# Patient Record
Sex: MALE | Race: WHITE | Employment: FULL TIME | URBAN - METROPOLITAN AREA
[De-identification: names, ages, dates, MRNs, and addresses within clinical notes are randomized per-mention and may not be internally consistent; named-entity substitution may affect disease eponyms.]

---

## 2017-01-23 RX ORDER — BUSPIRONE HYDROCHLORIDE 15 MG/1
TABLET ORAL
Qty: 360 TAB | Refills: 1 | Status: SHIPPED | OUTPATIENT
Start: 2017-01-23 | End: 2017-07-20 | Stop reason: SDUPTHER

## 2017-02-15 RX ORDER — DICLOFENAC SODIUM 75 MG/1
TABLET, DELAYED RELEASE ORAL
Qty: 60 TAB | Refills: 0 | Status: SHIPPED | OUTPATIENT
Start: 2017-02-15

## 2017-02-20 RX ORDER — PAROXETINE HYDROCHLORIDE 40 MG/1
TABLET, FILM COATED ORAL
Qty: 90 TAB | Refills: 1 | Status: SHIPPED | OUTPATIENT
Start: 2017-02-20 | End: 2017-06-26 | Stop reason: SDUPTHER

## 2017-03-14 RX ORDER — DICLOFENAC SODIUM 75 MG/1
TABLET, DELAYED RELEASE ORAL
Qty: 60 TAB | Refills: 0 | OUTPATIENT
Start: 2017-03-14

## 2017-03-14 NOTE — TELEPHONE ENCOUNTER
Received faxed refill request for this medication from the pharmacy that is on file.     diclofenac EC (VOLTAREN) 75 mg EC tablet  Normal   Due office visit   Disp-60 Tab, R-0

## 2017-06-26 RX ORDER — PAROXETINE HYDROCHLORIDE 40 MG/1
TABLET, FILM COATED ORAL
Qty: 90 TAB | Refills: 0 | Status: SHIPPED | OUTPATIENT
Start: 2017-06-26 | End: 2017-12-17 | Stop reason: SDUPTHER

## 2017-07-20 RX ORDER — BUSPIRONE HYDROCHLORIDE 15 MG/1
TABLET ORAL
Qty: 360 TAB | Refills: 0 | Status: SHIPPED | OUTPATIENT
Start: 2017-07-20 | End: 2017-10-16 | Stop reason: SDUPTHER

## 2017-10-09 ENCOUNTER — TELEPHONE (OUTPATIENT)
Dept: FAMILY MEDICINE CLINIC | Age: 52
End: 2017-10-09

## 2017-10-09 NOTE — TELEPHONE ENCOUNTER
Patient is calling in regards to a missed call from [place], tried contacting nurse, no success.     Best call back # for patient:[phone]

## 2017-10-16 RX ORDER — BUSPIRONE HYDROCHLORIDE 15 MG/1
TABLET ORAL
Qty: 360 TAB | Refills: 0 | Status: SHIPPED | OUTPATIENT
Start: 2017-10-16 | End: 2018-01-13 | Stop reason: SDUPTHER

## 2017-11-22 ENCOUNTER — OFFICE VISIT (OUTPATIENT)
Dept: FAMILY MEDICINE CLINIC | Age: 52
End: 2017-11-22

## 2017-11-22 VITALS
OXYGEN SATURATION: 97 % | HEIGHT: 66 IN | WEIGHT: 204.8 LBS | HEART RATE: 60 BPM | DIASTOLIC BLOOD PRESSURE: 74 MMHG | SYSTOLIC BLOOD PRESSURE: 133 MMHG | RESPIRATION RATE: 18 BRPM | TEMPERATURE: 97.6 F | BODY MASS INDEX: 32.92 KG/M2

## 2017-11-22 DIAGNOSIS — R56.9 CONVULSIONS, UNSPECIFIED CONVULSION TYPE (HCC): ICD-10-CM

## 2017-11-22 DIAGNOSIS — Q28.2 AVM (ARTERIOVENOUS MALFORMATION) BRAIN: Chronic | ICD-10-CM

## 2017-11-22 DIAGNOSIS — F43.23 ADJUSTMENT DISORDER WITH MIXED ANXIETY AND DEPRESSED MOOD: ICD-10-CM

## 2017-11-22 DIAGNOSIS — Z85.828 HISTORY OF SKIN CANCER: ICD-10-CM

## 2017-11-22 DIAGNOSIS — Z00.00 ROUTINE PHYSICAL EXAMINATION: Primary | ICD-10-CM

## 2017-11-22 DIAGNOSIS — L70.0 ACNE VULGARIS: ICD-10-CM

## 2017-11-22 DIAGNOSIS — Z11.59 NEED FOR HEPATITIS C SCREENING TEST: ICD-10-CM

## 2017-11-22 DIAGNOSIS — L03.90 CELLULITIS, UNSPECIFIED CELLULITIS SITE: ICD-10-CM

## 2017-11-22 DIAGNOSIS — R56.9 SEIZURES (HCC): ICD-10-CM

## 2017-11-22 DIAGNOSIS — M19.011 PRIMARY OSTEOARTHRITIS OF RIGHT SHOULDER: Chronic | ICD-10-CM

## 2017-11-22 RX ORDER — AMOXICILLIN 500 MG/1
CAPSULE ORAL
COMMUNITY
Start: 2017-10-25 | End: 2017-11-22 | Stop reason: ALTCHOICE

## 2017-11-22 RX ORDER — CEPHALEXIN 500 MG/1
500 CAPSULE ORAL 4 TIMES DAILY
Qty: 28 CAP | Refills: 0 | Status: SHIPPED | OUTPATIENT
Start: 2017-11-22 | End: 2017-11-29

## 2017-11-22 NOTE — PATIENT INSTRUCTIONS

## 2017-11-22 NOTE — MR AVS SNAPSHOT
Visit Information Date & Time Provider Department Dept. Phone Encounter #  
 11/22/2017  8:15 AM Ursula Alarcon  [facility] [phone] [id number] Upcoming Health Maintenance Date Due COLONOSCOPY 4/15/2017 DTaP/Tdap/Td series (2 - Td) 8/2/2021 Allergies as of 11/22/2017  Review Complete On: 11/22/2017 By: Arun Mcnair LPN No Known Allergies Current Immunizations  Never Reviewed Name Date Influenza Vaccine 11/7/2015 Influenza Vaccine Split 11/1/2012 TDAP Vaccine 8/2/2011 Not reviewed this visit You Were Diagnosed With   
  
 Codes Comments Routine physical examination    -  Primary ICD-10-CM: Z00.00 ICD-9-CM: V70.0 Seizures ([facility]     ICD-10-CM: R56.9 ICD-9-CM: 780.39 Convulsions, unspecified convulsion type ([facility]     ICD-10-CM: R56.9 ICD-9-CM: 780.39   
 AVM (arteriovenous malformation) brain     ICD-10-CM: Q28.2 ICD-9-CM: 80.80 Primary osteoarthritis of right shoulder     ICD-10-CM: M19.011 
ICD-9-CM: 715.11 Adjustment disorder with mixed anxiety and depressed mood     ICD-10-CM: F43.23 
ICD-9-CM: 309.28 History of skin cancer     ICD-10-CM: G47.162 ICD-9-CM: V10.83 Acne vulgaris     ICD-10-CM: L70.0 ICD-9-CM: 706.1 Need for hepatitis C screening test     ICD-10-CM: Z11.59 
ICD-9-CM: V73.89 Vitals BP Pulse Temp Resp Height(growth percentile) Weight(growth percentile) 133/74 (BP 1 Location: Left arm, BP Patient Position: Sitting) 60 97.6 °F (36.4 °C) (Oral) 18 5' 6\" (1.676 m) 204 lb 12.8 oz (92.9 kg) SpO2 BMI Smoking Status 97% 33.06 kg/m2 Former Smoker Vitals History BMI and BSA Data Body Mass Index Body Surface Area 33.06 kg/m 2 2.08 m 2 Preferred Pharmacy Pharmacy Name Phone [address] [facility], [address] [facility], [address] [phone] Your Updated Medication List  
  
   
 This list is accurate as of: 11/22/17  9:24 AM.  Always use your most recent med list.  
  
  
  
  
 AVAR-E LS 10-2 % Crea Generic drug:  Sulfacetamide Sodium-Sulfur  
  
 busPIRone 15 mg tablet Commonly known as:  BUSPAR  
TAKE TWO TABLETS BY MOUTH TWICE A DAY **GENERIC FOR: BUSPAR  
  
 carBAMazepine 200 mg tablet Commonly known as:  TEGretol Take 3 Tabs by mouth two (2) times a day. MON,WED and FRI- 600mg bid Other days, 800mg in am and 600mg in pm  Indications: PREVENT SEIZURES AFTER HEAD TRAUMA OR SURGERY  
  
 diclofenac EC 75 mg EC tablet Commonly known as:  VOLTAREN  
TAKE ONE TABLET BY MOUTH TWICE A DAY  WITH FOOD AS NEEDED FOR SHOULDER PAIN. **GENERIC FOR: VOLTAREN  
  
 doxycycline 100 mg capsule Commonly known as:  VIBRAMYCIN  
  
 ONE-A-DAY MEN'S 50 PLUS 400- mcg Tab Generic drug:  multivit-min-folic-vit K-lycop Take  by mouth. PARoxetine 40 mg tablet Commonly known as:  PAXIL TAKE ONE TABLET BY MOUTH DAILY  
  
 TYLENOL EXTRA STRENGTH 500 mg tablet Generic drug:  acetaminophen Take 2 Tabs by mouth every six (6) hours as needed for Pain. We Performed the Following AMB POC EKG ROUTINE W/ 12 LEADS, INTER & REP [47479 CPT(R)] CARBAMAZEPINE [id number] CPT(R)] CBC W/O DIFF [69399 CPT(R)] HEPATITIS C AB [14727 CPT(R)] LIPID PANEL [55281 CPT(R)] METABOLIC PANEL, COMPREHENSIVE [73027 CPT(R)] PSA W/ REFLX FREE PSA [55587 CPT(R)] URINALYSIS W/MICROSCOPIC [08938 CPT(R)] Patient Instructions Well Visit, Men 48 to 72: Care Instructions Your Care Instructions Physical exams can help you stay healthy. Your doctor has checked your overall health and may have suggested ways to take good care of yourself. He or she also may have recommended tests. At home, you can help prevent illness with healthy eating, regular exercise, and other steps. Follow-up care is a key part of your treatment and safety.  Be sure to make and go to all appointments, and call your doctor if you are having problems. It's also a good idea to know your test results and keep a list of the medicines you take. How can you care for yourself at home? · Reach and stay at a healthy weight. This will lower your risk for many problems, such as obesity, diabetes, heart disease, and high blood pressure. · Get at least 30 minutes of exercise on most days of the week. Walking is a good choice. You also may want to do other activities, such as running, swimming, cycling, or playing tennis or team sports. · Do not smoke. Smoking can make health problems worse. If you need help quitting, talk to your doctor about stop-smoking programs and medicines. These can increase your chances of quitting for good. · Protect your skin from too much sun. When you're outdoors from 10 a.m. to 4 p.m., stay in the shade or cover up with clothing and a hat with a wide brim. Wear sunglasses that block UV rays. Even when it's cloudy, put broad-spectrum sunscreen (SPF 30 or higher) on any exposed skin. · See a dentist one or two times a year for checkups and to have your teeth cleaned. · Wear a seat belt in the car. · Limit alcohol to 2 drinks a day. Too much alcohol can cause health problems. Follow your doctor's advice about when to have certain tests. These tests can spot problems early. · Cholesterol. Your doctor will tell you how often to have this done based on your overall health and other things that can increase your risk for heart attack and stroke. · Blood pressure. Have your blood pressure checked during a routine doctor visit. Your doctor will tell you how often to check your blood pressure based on your age, your blood pressure results, and other factors. · Prostate exam. Talk to your doctor about whether you should have a blood test (called a PSA test) for prostate cancer.  Experts disagree on whether men should have this test. Some experts recommend that you discuss the benefits and risks of the test with your doctor. · Diabetes. Ask your doctor whether you should have tests for diabetes. · Vision. Some experts recommend that you have yearly exams for glaucoma and other age-related eye problems starting at age 48. · Hearing. Tell your doctor if you notice any change in your hearing. You can have tests to find out how well you hear. · Colon cancer. You should begin tests for colon cancer at age 48. You may have one of several tests. Your doctor will tell you how often to have tests based on your age and risk. Risks include whether you already had a precancerous polyp removed from your colon or whether your parent, brother, sister, or child has had colon cancer. · Heart attack and stroke risk. At least every 4 to 6 years, you should have your risk for heart attack and stroke assessed. Your doctor uses factors such as your age, blood pressure, cholesterol, and whether you smoke or have diabetes to show what your risk for a heart attack or stroke is over the next 10 years. · Abdominal aortic aneurysm. Ask your doctor whether you should have a test to check for an aneurysm. You may need a test if you ever smoked or if your parent, brother, sister, or child has had an aneurysm. When should you call for help? Watch closely for changes in your health, and be sure to contact your doctor if you have any problems or symptoms that concern you. Where can you learn more? Go to http://chris-leland.info/. Enter W200 in the search box to learn more about \"Well Visit, Men 48 to 72: Care Instructions. \" Current as of: May 12, 2017 Content Version: 11.4 © 6534-5258 [employer]wise[employer]orporated. Care instructions adapted under license by [employer] (which disclaims liability or warranty for this information).  If you have questions about a medical condition or this instruction, always ask your healthcare professional. [address] any warranty or liability for your use of this information. Introducing [facility] [place] [facility]! Dear Cecilia Bearden: Thank you for requesting a [employer] account. Our records indicate that you already have an active [employer] account. You can access your account anytime at [email or web address] Did you know that you can access your hospital and ER discharge instructions at any time in [employer]? You can also review all of your test results from your hospital stay or ER visit. Additional Information If you have questions, please visit the Frequently Asked Questions section of the [employer] website at [email or web address] Remember, [employer] is NOT to be used for urgent needs. For medical emergencies, dial 911. Now available from your iPhone and Android! Please provide this summary of care documentation to your next provider. Your primary care clinician is listed as [address] [address], [facility]/s . If you have any questions after today's visit, please call [phone].

## 2017-11-22 NOTE — PROGRESS NOTES
HISTORY OF PRESENT ILLNESS  HPI  Newton Mcclelland is a 46 y.o. male with history of adjustment disorder with anxiety and depressed mood who presents to office today for a complete physical. Pt complains of intermittent right leg pain, usually in his foot and ankle, aggravated by weight bearing. He notes chronic decreased sensation in his right leg. Pt complains of swelling and erythema in his right middle finger for the past 2 weeks. He notes he had a splinter removed from the area shortly before his symptoms began. He is currently taking doxycycline. Past Medical History:   Diagnosis Date    Adjustment disorder with mixed anxiety and depressed mood     AVM (arteriovenous malformation)left brain-Surgery 1981-spasticity of right arm and leg,seizure 1982 35/50/2671    Clicking right shoulder 4/6/2015    History of skin cancer 4/6/2015    Primary osteoarthritis of right shoulder- on 12/2015 X ray 1/9/2016    Seizures ([address]     last seizure 1982     Past Surgical History:   Procedure Laterality Date    HX HEENT      wisdom teeth    HX TONSIL AND ADENOIDECTOMY      HX TONSILLECTOMY      NEUROLOGICAL PROCEDURE UNLISTED  22 yo   1981    L side brain surgery/ arteriovenous malformation     Current Outpatient Prescriptions on File Prior to Visit   Medication Sig Dispense Refill    busPIRone (BUSPAR) 15 mg tablet TAKE TWO TABLETS BY MOUTH TWICE A DAY **GENERIC FOR: BUSPAR 360 Tab 0    PARoxetine (PAXIL) 40 mg tablet TAKE ONE TABLET BY MOUTH DAILY 90 Tab 0    diclofenac EC (VOLTAREN) 75 mg EC tablet TAKE ONE TABLET BY MOUTH TWICE A DAY  WITH FOOD AS NEEDED FOR SHOULDER PAIN. **GENERIC FOR: VOLTAREN 60 Tab 0    acetaminophen (TYLENOL EXTRA STRENGTH) 500 mg tablet Take 2 Tabs by mouth every six (6) hours as needed for Pain.  AVAR-E LS 10-2 % crea       doxycycline (VIBRAMYCIN) 100 mg capsule       carBAMazepine (TEGRETOL) 200 mg tablet Take 3 Tabs by mouth two (2) times a day.  MON,WED and FRI- 600mg bid  Other days, 800mg in am and 600mg in pm  Indications: PREVENT SEIZURES AFTER HEAD TRAUMA OR SURGERY 210 Tab 11    multivit-min-FA-K-lycopene (ONE-A-DAY MEN'S 50+ ADVANTAGE) 400- mcg Tab Take  by mouth. No current facility-administered medications on file prior to visit. No Known Allergies  Family History   Problem Relation Age of Onset    Hypertension Mother     Stroke Father     Heart Disease Father     Hypertension Father     Cancer Father      colorectal spread to liver     Heart Disease Paternal Aunt      mi    Heart Disease Paternal Uncle      mi     Social History     Social History    Marital status:      Spouse name: N/A    Number of children: N/A    Years of education: N/A     Social History Main Topics    Smoking status: Former Smoker     Packs/day: 1.50     Years: 10.00     Types: Cigarettes     Quit date: 2/4/2011    Smokeless tobacco: Former User      Comment: chart indicated \"heavy smoker\"    Alcohol use Yes      Comment: rarely    Drug use: No    Sexual activity: Not Asked     Other Topics Concern    None     Social History Narrative             Review of Systems   Constitutional: Negative for chills, diaphoresis, fever, malaise/fatigue and weight loss. HENT: Negative for congestion, ear discharge, ear pain, hearing loss, nosebleeds, sore throat and tinnitus. Eyes: Negative for blurred vision, double vision, photophobia, pain, discharge and redness. Respiratory: Negative for cough, hemoptysis, sputum production, shortness of breath, wheezing and stridor. Cardiovascular: Negative for chest pain, palpitations, orthopnea, claudication, leg swelling and PND. Gastrointestinal: Negative for abdominal pain, blood in stool, constipation, diarrhea, heartburn, melena, nausea and vomiting. Genitourinary: Negative for dysuria, flank pain, frequency, hematuria and urgency. Musculoskeletal: Positive for joint pain (right foot & ankle).  Negative for back pain, falls, myalgias and neck pain. Skin: Negative for itching and rash. right middle finger erythema   Neurological: Negative for dizziness, tingling, tremors, sensory change, speech change, focal weakness, seizures, loss of consciousness, weakness and headaches. Endo/Heme/Allergies: Negative for environmental allergies and polydipsia. Does not bruise/bleed easily. Psychiatric/Behavioral: Negative for depression, hallucinations, memory loss, substance abuse and suicidal ideas. The patient is not nervous/anxious and does not have insomnia. Results for orders placed or performed in visit on 04/06/15   LIPID PANEL   Result Value Ref Range    Cholesterol, total 200 (H) 100 - 199 mg/dL    Triglyceride 109 0 - 149 mg/dL    HDL Cholesterol 70 >39 mg/dL    VLDL, calculated 22 5 - 40 mg/dL    LDL, calculated 108 (H) 0 - 99 mg/dL   METABOLIC PANEL, COMPREHENSIVE   Result Value Ref Range    Glucose 82 65 - 99 mg/dL    BUN 14 6 - 24 mg/dL    Creatinine 0.84 0.76 - 1.27 mg/dL    GFR est non- >59 mL/min/1.73    GFR est  >59 mL/min/1.73    BUN/Creatinine ratio 17 9 - 20    Sodium 140 134 - 144 mmol/L    Potassium 4.5 3.5 - 5.2 mmol/L    Chloride 99 97 - 108 mmol/L    CO2 25 18 - 29 mmol/L    Calcium 9.4 8.7 - 10.2 mg/dL    Protein, total 6.7 6.0 - 8.5 g/dL    Albumin 4.5 3.5 - 5.5 g/dL    GLOBULIN, TOTAL 2.2 1.5 - 4.5 g/dL    A-G Ratio 2.0 1.1 - 2.5    Bilirubin, total 0.3 0.0 - 1.2 mg/dL    Alk.  phosphatase 57 39 - 117 IU/L    AST (SGOT) 22 0 - 40 IU/L    ALT (SGPT) 19 0 - 44 IU/L   CARBAMAZEPINE   Result Value Ref Range    Carbamazepine 10.1 4.0 - 12.0 ug/mL   PLEASE NOTE   Result Value Ref Range    Please note Comment    CVD REPORT   Result Value Ref Range    INTERPRETATION Note              Physical Exam  Visit Vitals    /74 (BP 1 Location: Left arm, BP Patient Position: Sitting)    Pulse 60    Temp 97.6 °F (36.4 °C) (Oral)    Resp 18    Ht 5' 6\" (1.676 m)    Wt 204 lb 12.8 oz (92.9 kg)    SpO2 97%    BMI 33.06 kg/m2     General:  Alert, cooperative, no distress, appears stated age. Head:  Normocephalic, without obvious abnormality, atraumatic. Eyes:  Conjunctivae/corneas clear. PERRL, EOMs intact. Fundi benign   Ears:  Normal TMs and external ear canals both ears. Nose: Nares normal. Septum midline. Mucosa normal. No drainage or sinus tenderness. Throat: Lips, mucosa, and tongue normal. Teeth and gums normal.   Neck: Supple, symmetrical, trachea midline, no adenopathy, thyroid: no enlargement/tenderness/nodules, no carotid bruit and no JVD. Back:   Symmetric, no curvature. ROM normal. No CVA tenderness. Lungs:   Clear to auscultation bilaterally. Chest wall:  No tenderness or deformity. Heart:  Regular rate and rhythm, S1, S2 normal, no murmur, click, rub or gallop. Abdomen:   Soft, non-tender. Bowel sounds normal. No masses,  No organomegaly. Genitalia:  Normal male without lesion, discharge or tenderness. Rectal:  Normal tone, normal prostate, no masses or tenderness  Guaiac negative stool. Extremities: Extremities normal, no cyanosis or edema. His right middle finger has some erythema and a little bit of scaly skin around a recent wound, slightly tender to palpation, no obvious foreign body present, no signs of pus   Pulses: 2+ and symmetric all extremities. Skin: Skin color, texture, turgor normal. No rashes or lesions   Lymph nodes: Cervical, supraclavicular, and axillary nodes normal.   Neurologic: CNII-XII intact. Normal strength, sensation and reflexes throughout. ASSESSMENT and PLAN    ICD-10-CM ICD-9-CM    1. Routine physical examination S77.31 Z31.4 METABOLIC PANEL, COMPREHENSIVE      LIPID PANEL      PSA W/ REFLX FREE PSA      AMB POC EKG ROUTINE W/ 12 LEADS, INTER & REP      URINALYSIS W/MICROSCOPIC      CBC W/O DIFF   2. Seizures- only one in 1982-due to AVM/ assoc Sx R56.9 780.39 CBC W/O DIFF      CARBAMAZEPINE   3.  Convulsions, unspecified convulsion type ([facility] R56.9 780.39    4. AVM (arteriovenous malformation)left brain-Surgery 1981-spasticity of right arm and leg,seizure in 1982 Q28.2 747.81    5. Primary osteoarthritis of right shoulder- on 12/2015 X ray M19.011 715.11    6. Adjustment disorder with mixed anxiety and depressed mood F43.23 309.28    7. History of skin cancer Z85.828 V10.83    8. Acne vulgaris L70.0 706.1    9. Need for hepatitis C screening test Z11.59 V73.89 HEPATITIS C AB   10. Cellulitis, unspecified cellulitis site- right middle finger. L03.90 682.9      Diagnoses and all orders for this visit:    1. Routine physical examination  -     METABOLIC PANEL, COMPREHENSIVE  -     LIPID PANEL  -     PSA W/ REFLX FREE PSA  -     AMB POC EKG ROUTINE W/ 12 LEADS, INTER & REP  -     URINALYSIS W/MICROSCOPIC  -     CBC W/O DIFF    2. Seizures- only one in 1982-due to AVM/ assoc Sx  -     CBC W/O DIFF  -     CARBAMAZEPINE    3. Convulsions, unspecified convulsion type ([facility]    4. AVM (arteriovenous malformation)left brain-Surgery 1981-spasticity of right arm and leg,seizure in 1982    5. Primary osteoarthritis of right shoulder- on 12/2015 X ray    6. Adjustment disorder with mixed anxiety and depressed mood    7. History of skin cancer    8. Acne vulgaris    9. Need for hepatitis C screening test  -     HEPATITIS C AB    10. Cellulitis, unspecified cellulitis site- right middle finger. Other orders  -     cephALEXin (KEFLEX) 500 mg capsule; Take 1 Cap by mouth four (4) times daily for 7 days. Follow-up Disposition:  Return in about 6 months (around 5/22/2018) for F/U cholesterol, yearly for physical ,prostate exam, F/U seizure . lab results and schedule of future lab studies reviewed with patient  reviewed diet, exercise and weight control  cardiovascular risk and specific lipid/LDL goals reviewed  reviewed medications and side effects in detail  Please call my office if there are any questions- [phone].   I will arrange for follow up after the lab tests done from today return    Call for refills on medications as needed. Discussed expected course/resolution/complications of diagnosis in detail with patient. Medication risks/benefits/costs/interactions/alternatives discussed with patient. Pt was given an after visit summary which includes diagnoses, current medications & vitals. Pt expressed understanding with the diagnosis and plan    I encouraged him to do a weekly \"heart check\" and went into detail about how to do that. I congratulated him on stopping smoking several years ago. I encouraged him to work on his weight, which is stable recently but has gone up a little through the years. Not noted above, but pt has not had any seizures in the past year since he was seen. He had a colonoscopy in 2014 and the chart says he needs to do one in 2017, so he'll call the GI doctor and figure that out. The chart says that his dad had colon cancer but he said it's his sister. Either way there's a family history of colon cancer, so he at least needs to do it every 5 years. For his finger, I recommended he put warm soaks on the area frequently until it becomes pain-free, and he should not manipulate the area, dig into the wound, etc. If the area become more tender, then we will have him take Keflex. Recommended a weekly \"heart check. \" I went into detail how to do this. This document was written by Angel Colon, as dictated by Vladimir Read MD.  I have reviewed and agree with the above note and have made corrections where appropriate Janusz Brenner M.D.

## 2017-11-22 NOTE — LETTER
11/30/2017 10:45 AM 
 
Mr. Bharati Bernard 
[address] [id number] Dear Bharati Bernard: 
 
Please find your most recent results below. Resulted Orders METABOLIC PANEL, COMPREHENSIVE Result Value Ref Range Glucose 91 65 - 99 mg/dL BUN 18 6 - 24 mg/dL Creatinine 0.84 0.76 - 1.27 mg/dL GFR est non- >59 mL/min/1.73 GFR est  >59 mL/min/1.73  
 BUN/Creatinine ratio 21 (H) 9 - 20 Sodium 141 134 - 144 mmol/L Potassium 4.5 3.5 - 5.2 mmol/L Chloride 100 96 - 106 mmol/L  
 CO2 25 18 - 29 mmol/L Calcium 9.5 8.7 - 10.2 mg/dL Protein, total 7.1 6.0 - 8.5 g/dL Albumin 4.7 3.5 - 5.5 g/dL GLOBULIN, TOTAL 2.4 1.5 - 4.5 g/dL A-G Ratio 2.0 1.2 - 2.2 Bilirubin, total 0.3 0.0 - 1.2 mg/dL Alk. phosphatase 68 39 - 117 IU/L  
 AST (SGOT) 21 0 - 40 IU/L  
 ALT (SGPT) 20 0 - 44 IU/L Narrative Performed at:  [address]  [id number] : Tameka Benavides MD, Phone:  [phone] LIPID PANEL Result Value Ref Range Cholesterol, total 193 100 - 199 mg/dL Triglyceride 105 0 - 149 mg/dL HDL Cholesterol 67 >39 mg/dL VLDL, calculated 21 5 - 40 mg/dL LDL, calculated 105 (H) 0 - 99 mg/dL Narrative Performed at:  [address]  [id number] : Tameka Benavides MD, Phone:  [phone] PSA W/ REFLX FREE PSA Result Value Ref Range Prostate Specific Ag 0.4 0.0 - 4.0 ng/mL Comment:  
   Roche ECLIA methodology. According to the American Urological Association, Serum PSA should 
decrease and remain at undetectable levels after radical 
prostatectomy. The AUA defines biochemical recurrence as an initial 
PSA value 0.2 ng/mL or greater followed by a subsequent confirmatory PSA value 0.2 ng/mL or greater. Values obtained with different assay methods or kits cannot be used interchangeably. Results cannot be interpreted as absolute evidence 
of the presence or absence of malignant disease. Reflex Criteria Comment Comment:  
   The percent free PSA is performed on a reflex basis only when the 
total PSA is between 4.0 and 10.0 ng/mL. Narrative Performed at:  [address]  [id number] : Kylie iRchards MD, Phone:  [phone] URINALYSIS W/MICROSCOPIC Result Value Ref Range Specific Gravity 1.021 1.005 - 1.030  
 pH (UA) 6.0 5.0 - 7.5 Color Yellow Yellow Appearance Clear Clear Leukocyte Esterase Negative Negative Protein Negative Negative/Trace Glucose Negative Negative Ketone Negative Negative Blood Negative Negative Bilirubin Negative Negative Urobilinogen 0.2 0.2 - 1.0 mg/dL Nitrites Negative Negative Microscopic Examination Comment Comment:  
   Microscopic follows if indicated. Microscopic exam See additional order Comment:  
   Microscopic was indicated and was performed. Narrative Performed at:  [address]  [id number] : Kylie Richards MD, Phone:  [phone] CBC W/O DIFF Result Value Ref Range WBC 5.3 3.4 - 10.8 x10E3/uL  
 RBC 4.34 4.14 - 5.80 x10E6/uL HGB 13.7 12.6 - 17.7 g/dL Comment: **Effective December 4, 2017 the reference interval** 
  for Hemoglobin MALES only will be changing to: Males 13-15 years: 12.6 - 17.7 Males   >15 years: 13.0 - 17.7 HCT 41.6 37.5 - 51.0 % MCV 96 79 - 97 fL  
 MCH 31.6 26.6 - 33.0 pg  
 MCHC 32.9 31.5 - 35.7 g/dL  
 RDW 12.6 12.3 - 15.4 % PLATELET 284 808 - 796 x10E3/uL Narrative Performed at:  [address]  [id number] : Kylie Richards MD, Phone:  [phone] CARBAMAZEPINE Result Value Ref Range Carbamazepine 14.6 () 4.0 - 12.0 ug/mL Comment: In conjunction with other antiepileptic drugs Therapeutic  4.0 -  8.0 Toxicity     9.0 - 12.0 Carbamazepine alone Therapeutic  8.0 - 12.0 Detection Limit =  2.0 
                          <2.0 indicated None Detected Patient drug level exceeds published reference range. Evaluate 
clinically for signs of potential toxicity. Narrative Performed at:  [address]  [id number] : Alice Wagner MD, Phone:  [phone] HEPATITIS C AB Result Value Ref Range Hep C Virus Ab <0.1 0.0 - 0.9 s/co ratio Comment:  
                                     Negative:     < 0.8 Indeterminate: 0.8 - 0.9 Positive:     > 0.9 The CDC recommends that a positive HCV antibody result 
 be followed up with a HCV Nucleic Acid Amplification 
 test (279358). Narrative Performed at:  [address]  [id number] : Alice Wagner MD, Phone:  [phone] MICROSCOPIC EXAMINATION Result Value Ref Range WBC None seen 0 - 5 /hpf  
 RBC 0-2 0 - 2 /hpf Epithelial cells None seen 0 - 10 /hpf Casts None seen None seen /lpf Mucus Present Not Estab. Bacteria None seen None seen/Few Narrative Performed at:  [address]  [id number] : Alice Wagner MD, Phone:  [phone] CVD REPORT Result Value Ref Range INTERPRETATION Note Comment:  
   Supplemental report is available. Narrative Performed at:  [address] [place] [address]  [id number] : Armani Carrasco PhD, Phone:  [phone] RECOMMENDATIONS: 
  
    
  Tegretol level is too high- start  2 BID( 600 mg BID) for 1 week and then 800 mg in AM and 600 in PM on M &Th and 600 mg BID the other days and recheck level in 1 month ( lab only).     Good news otherwise!! As you can see above, your lab tests done recently at your physical all came back normal( except the Tegretol level; no signs of diabetes, excellent cholesterol levels, normal liver and kidney function. I would recommend that you follow up for a full physical every 2 years until the age of 61, and then every year. The years that you are not having a physical, you will need a short appointment for a prostate exam and to check your tegretol level ; your PSA (prostate cancer check) also came back normal.   
 
 
 
Please call me if you have any questions: [phone] Sincerely, 
 
 
Arnaud Nesbitt MD

## 2017-11-22 NOTE — PROGRESS NOTES
\"Reviewed record in preparation for visit and have obtained the necessary documentation\"  Chief Complaint   Patient presents with    Complete Physical    Finger Swelling     right 2nd finger splinter removed x 1 week ago, redness and lesion remain in area of splinter     Leg Pain     right side        1. Have you been to the ER, urgent care clinic since your last visit? Hospitalized since your last visit? No    2. Have you seen or consulted any other health care providers outside of the [address] [place] [address] since your last visit? Include any pap smears or colon screening.  No

## 2017-11-23 LAB
ALBUMIN SERPL-MCNC: 4.7 G/DL (ref 3.5–5.5)
ALBUMIN/GLOB SERPL: 2 {RATIO} (ref 1.2–2.2)
ALP SERPL-CCNC: 68 IU/L (ref 39–117)
ALT SERPL-CCNC: 20 IU/L (ref 0–44)
APPEARANCE UR: CLEAR
AST SERPL-CCNC: 21 IU/L (ref 0–40)
BACTERIA #/AREA URNS HPF: NORMAL /[HPF]
BILIRUB SERPL-MCNC: 0.3 MG/DL (ref 0–1.2)
BILIRUB UR QL STRIP: NEGATIVE
BUN SERPL-MCNC: 18 MG/DL (ref 6–24)
BUN/CREAT SERPL: 21 (ref 9–20)
CALCIUM SERPL-MCNC: 9.5 MG/DL (ref 8.7–10.2)
CARBAMAZEPINE SERPL-MCNC: 14.6 UG/ML (ref 4–12)
CASTS URNS QL MICRO: NORMAL /LPF
CHLORIDE SERPL-SCNC: 100 MMOL/L (ref 96–106)
CHOLEST SERPL-MCNC: 193 MG/DL (ref 100–199)
CO2 SERPL-SCNC: 25 MMOL/L (ref 18–29)
COLOR UR: YELLOW
CREAT SERPL-MCNC: 0.84 MG/DL (ref 0.76–1.27)
EPI CELLS #/AREA URNS HPF: NORMAL /HPF
ERYTHROCYTE [DISTWIDTH] IN BLOOD BY AUTOMATED COUNT: 12.6 % (ref 12.3–15.4)
GFR SERPLBLD CREATININE-BSD FMLA CKD-EPI: 100 ML/MIN/1.73
GFR SERPLBLD CREATININE-BSD FMLA CKD-EPI: 116 ML/MIN/1.73
GLOBULIN SER CALC-MCNC: 2.4 G/DL (ref 1.5–4.5)
GLUCOSE SERPL-MCNC: 91 MG/DL (ref 65–99)
GLUCOSE UR QL: NEGATIVE
HCT VFR BLD AUTO: 41.6 % (ref 37.5–51)
HCV AB S/CO SERPL IA: <0.1 S/CO RATIO (ref 0–0.9)
HDLC SERPL-MCNC: 67 MG/DL
HGB BLD-MCNC: 13.7 G/DL (ref 12.6–17.7)
HGB UR QL STRIP: NEGATIVE
INTERPRETATION, 910389: NORMAL
KETONES UR QL STRIP: NEGATIVE
LDLC SERPL CALC-MCNC: 105 MG/DL (ref 0–99)
LEUKOCYTE ESTERASE UR QL STRIP: NEGATIVE
MCH RBC QN AUTO: 31.6 PG (ref 26.6–33)
MCHC RBC AUTO-ENTMCNC: 32.9 G/DL (ref 31.5–35.7)
MCV RBC AUTO: 96 FL (ref 79–97)
MICRO URNS: NORMAL
MICRO URNS: NORMAL
MUCOUS THREADS URNS QL MICRO: PRESENT
NITRITE UR QL STRIP: NEGATIVE
PH UR STRIP: 6 [PH] (ref 5–7.5)
PLATELET # BLD AUTO: 297 X10E3/UL (ref 150–379)
POTASSIUM SERPL-SCNC: 4.5 MMOL/L (ref 3.5–5.2)
PROT SERPL-MCNC: 7.1 G/DL (ref 6–8.5)
PROT UR QL STRIP: NEGATIVE
PSA SERPL-MCNC: 0.4 NG/ML (ref 0–4)
RBC # BLD AUTO: 4.34 X10E6/UL (ref 4.14–5.8)
RBC #/AREA URNS HPF: NORMAL /HPF
REFLEX CRITERIA: NORMAL
SODIUM SERPL-SCNC: 141 MMOL/L (ref 134–144)
SP GR UR: 1.02 (ref 1–1.03)
TRIGL SERPL-MCNC: 105 MG/DL (ref 0–149)
UROBILINOGEN UR STRIP-MCNC: 0.2 MG/DL (ref 0.2–1)
VLDLC SERPL CALC-MCNC: 21 MG/DL (ref 5–40)
WBC # BLD AUTO: 5.3 X10E3/UL (ref 3.4–10.8)
WBC #/AREA URNS HPF: NORMAL /HPF

## 2017-11-29 NOTE — PROGRESS NOTES
Tegretol level is too high- check on his present dosing- taking more than on directions? If no, then 2 BID( 600 mg BID) for 1 week and then 800 mg in AM and 600 in PM on M &Th and 600 mg BID the other days and recheck level in 1 month ( lab only). Good news otherwise!! As you can see above, your lab tests done recently at your physical all came back normal( except the Tegretol level; no signs of diabetes, excellent cholesterol levels, normal liver and kidney function. I would recommend that you follow up for a full physical every 2 years until the age of 61, and then every year.  The years that you are not having a physical, you will need a short appointment for a prostate exam and to check your tegretol level ; your PSA (prostate cancer check) also came back normal.

## 2017-11-30 RX ORDER — CARBAMAZEPINE 200 MG/1
TABLET ORAL
Qty: 210 TAB | Refills: 11
Start: 2017-11-30 | End: 2019-09-20 | Stop reason: SDUPTHER

## 2017-11-30 NOTE — PROGRESS NOTES
Patient's dose verified. He will change to new dosing and repeat lab in 1 month.  Letter sent as well

## 2017-12-18 RX ORDER — PAROXETINE HYDROCHLORIDE 40 MG/1
TABLET, FILM COATED ORAL
Qty: 90 TAB | Refills: 3 | Status: SHIPPED | OUTPATIENT
Start: 2017-12-18 | End: 2018-12-18 | Stop reason: SDUPTHER

## 2018-01-15 RX ORDER — BUSPIRONE HYDROCHLORIDE 15 MG/1
TABLET ORAL
Qty: 360 TAB | Refills: 1 | Status: SHIPPED | OUTPATIENT
Start: 2018-01-15 | End: 2018-07-16 | Stop reason: SDUPTHER

## 2018-07-17 RX ORDER — BUSPIRONE HYDROCHLORIDE 15 MG/1
TABLET ORAL
Qty: 360 TAB | Refills: 0 | Status: SHIPPED | OUTPATIENT
Start: 2018-07-17 | End: 2018-10-11 | Stop reason: SDUPTHER

## 2018-08-24 ENCOUNTER — OFFICE VISIT (OUTPATIENT)
Dept: FAMILY MEDICINE CLINIC | Age: 53
End: 2018-08-24

## 2018-08-24 VITALS
SYSTOLIC BLOOD PRESSURE: 128 MMHG | DIASTOLIC BLOOD PRESSURE: 60 MMHG | WEIGHT: 204 LBS | HEIGHT: 66 IN | OXYGEN SATURATION: 97 % | HEART RATE: 62 BPM | RESPIRATION RATE: 18 BRPM | BODY MASS INDEX: 32.78 KG/M2 | TEMPERATURE: 98.4 F

## 2018-08-24 DIAGNOSIS — L70.0 ACNE VULGARIS: ICD-10-CM

## 2018-08-24 DIAGNOSIS — R56.9 SEIZURES (HCC): Primary | ICD-10-CM

## 2018-08-24 DIAGNOSIS — R56.9 CONVULSIONS, UNSPECIFIED CONVULSION TYPE (HCC): ICD-10-CM

## 2018-08-24 DIAGNOSIS — M79.602 LEFT ARM PAIN: ICD-10-CM

## 2018-08-24 DIAGNOSIS — F43.22 ADJUSTMENT DISORDER WITH ANXIOUS MOOD: ICD-10-CM

## 2018-08-24 DIAGNOSIS — Q28.2 AVM (ARTERIOVENOUS MALFORMATION) BRAIN: Chronic | ICD-10-CM

## 2018-08-24 DIAGNOSIS — E78.5 DYSLIPIDEMIA, GOAL LDL BELOW 100: ICD-10-CM

## 2018-08-24 DIAGNOSIS — R17 JAUNDICE: ICD-10-CM

## 2018-08-24 RX ORDER — VENLAFAXINE HYDROCHLORIDE 37.5 MG/1
37.5 CAPSULE, EXTENDED RELEASE ORAL DAILY
Qty: 60 CAP | Refills: 1 | Status: SHIPPED | OUTPATIENT
Start: 2018-08-24 | End: 2018-08-30 | Stop reason: SDUPTHER

## 2018-08-24 RX ORDER — BENZOYL PEROXIDE 50 MG/ML
LIQUID TOPICAL
COMMUNITY
Start: 2018-06-02

## 2018-08-24 RX ORDER — SULFACETAMIDE SODIUM, SULFUR 100; 50 MG/G; MG/G
LOTION TOPICAL
COMMUNITY
Start: 2018-05-25

## 2018-08-24 RX ORDER — ADAPALENE 3 MG/G
GEL TOPICAL
COMMUNITY
Start: 2018-07-27

## 2018-08-24 NOTE — MR AVS SNAPSHOT
[facility] 
 
 
 [address] 
[phone] Patient: Herrera Romeo MRN: [id number] :1965 Visit Information Date & Time Provider Department Dept. Phone Encounter #  
 2018  4:00 PM Radha Belle MD [facility] [phone] [id number] Upcoming Health Maintenance Date Due Influenza Age 5 to Adult 3/31/2019* DTaP/Tdap/Td series (2 - Td) 2021 *Topic was postponed. The date shown is not the original due date. Allergies as of 2018  Review Complete On: 2018 By: Jaleesa De Leon LPN No Known Allergies Current Immunizations  Never Reviewed Name Date Influenza Vaccine 2015 Influenza Vaccine Split 2012 TDAP Vaccine 2011 Not reviewed this visit You Were Diagnosed With   
  
 Codes Comments Seizures ([facility]    -  Primary ICD-10-CM: R56.9 ICD-9-CM: 780.39 Dyslipidemia, goal LDL below 100     ICD-10-CM: E78.5 ICD-9-CM: 272.4 Jaundice     ICD-10-CM: R17 
ICD-9-CM: 339. 4 Vitals BP Pulse Temp Resp Height(growth percentile) Weight(growth percentile) 128/60 (BP 1 Location: Left arm, BP Patient Position: Sitting) 62 98.4 °F (36.9 °C) (Oral) 18 5' 6\" (1.676 m) 204 lb (92.5 kg) SpO2 BMI Smoking Status 97% 32.93 kg/m2 Former Smoker BMI and BSA Data Body Mass Index Body Surface Area  
 32.93 kg/m 2 2.08 m 2 Preferred Pharmacy Pharmacy Name Phone [facility] [address] [place] [address], [address] [place] [address], [address] [phone] Your Updated Medication List  
  
   
This list is accurate as of 18  5:27 PM.  Always use your most recent med list.  
  
  
  
  
 adapalene 0.3 % topical gel Commonly known as:  DIFFERIN  
  
 * AVAR-E LS 10-2 % Crea Generic drug:  Sulfacetamide Sodium-Sulfur * Sulfacetamide Sodium-Sulfur 10-5 % (w/w) lotion  
  
 benzoyl peroxide 5 % external liquid busPIRone 15 mg tablet Commonly known as:  BUSPAR  
TAKE TWO TABLETS BY MOUTH TWICE A DAY **GENERIC FOR: BUSPAR  
  
 carBAMazepine 200 mg tablet Commonly known as:  TEGretol 800mg in am and 600mg in pm MON and THUR 600mg bid other days  Indications: PREVENT SEIZURES AFTER HEAD TRAUMA OR SURGERY  
  
 diclofenac EC 75 mg EC tablet Commonly known as:  VOLTAREN  
TAKE ONE TABLET BY MOUTH TWICE A DAY  WITH FOOD AS NEEDED FOR SHOULDER PAIN. **GENERIC FOR: VOLTAREN  
  
 doxycycline 100 mg capsule Commonly known as:  VIBRAMYCIN  
  
 ONE-A-DAY MEN'S 50 PLUS 400- mcg Tab Generic drug:  multivit-min-folic-vit K-lycop Take  by mouth. PARoxetine 40 mg tablet Commonly known as:  PAXIL TAKE ONE TABLET BY MOUTH DAILY  
  
 TYLENOL EXTRA STRENGTH 500 mg tablet Generic drug:  acetaminophen Take 2 Tabs by mouth every six (6) hours as needed for Pain. venlafaxine-SR 37.5 mg capsule Commonly known as:  EFFEXOR-XR Take 1 Cap by mouth daily. One a day 1st week then 2 a day * Notice: This list has 2 medication(s) that are the same as other medications prescribed for you. Read the directions carefully, and ask your doctor or other care provider to review them with you. Prescriptions Sent to Pharmacy Refills  
 venlafaxine-SR (EFFEXOR-XR) 37.5 mg capsule 1 Sig: Take 1 Cap by mouth daily. One a day 1st week then 2 a day Class: Normal  
 Pharmacy: [facility] [address] [place] [address] #: [phone] Route: Oral  
  
We Performed the Following METABOLIC PANEL, COMPREHENSIVE [60156 CPT(R)] Introducing [facility]! Dear Lizzette Henry: Thank you for requesting a Rx[employer] account. Our records indicate that you already have an active Rx[employer] account. You can access your account anytime at [email or web address] Did you know that you can access your hospital and ER discharge instructions at any time in [employer]ellikine? You can also review all of your test results from your hospital stay or ER visit. Additional Information If you have questions, please visit the Frequently Asked Questions section of the [employer]ellikine website at [email or web address] Remember, Intellikine is NOT to be used for urgent needs. For medical emergencies, dial 911. Now available from your iPhone and Android! Please provide this summary of care documentation to your next provider. Your primary care clinician is listed as [address] [address], [facility]/s . If you have any questions after today's visit, please call [phone].

## 2018-08-24 NOTE — PROGRESS NOTES
Chief Complaint   Patient presents with    Anxiety     wife thinks pt needs his medication increased for worsening anxiety    Skin Problem     wife states pt looks jaundiced at times. \"REVIEWED RECORD IN PREPARATION FOR VISIT AND HAVE OBTAINED THE NECESSARY DOCUMENTATION\"  1. Have you been to the ER, urgent care clinic since your last visit? Hospitalized since your last visit? No    2. Have you seen or consulted any other health care providers outside of the [address] [place] [address] since your last visit? Include any pap smears or colon screening.  No

## 2018-08-24 NOTE — PROGRESS NOTES
HISTORY OF PRESENT ILLNESS  HPI  Aurea Adame is a 45 y.o. 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 Pt is currently taking Paxil 40 mg and asked for an increased dose. Pt and wife are concerned that he has jaundice. Pt complains of left elbow pain and left 2-4 finger numbness when he wake up. Past Medical History:   Diagnosis Date    Adjustment disorder with mixed anxiety and depressed mood     AVM (arteriovenous malformation)left brain-Surgery 1981-spasticity of right arm and leg,seizure 1982 26/98/9338    Clicking right shoulder 4/6/2015    History of skin cancer 4/6/2015    Primary osteoarthritis of right shoulder- on 12/2015 X ray 1/9/2016    Seizures ([address]     last seizure 1982     Past Surgical History:   Procedure Laterality Date    HX HEENT      wisdom teeth    HX TONSIL AND ADENOIDECTOMY      HX TONSILLECTOMY      NEUROLOGICAL PROCEDURE UNLISTED  22 yo   1981    L side brain surgery/ arteriovenous malformation     Current Outpatient Prescriptions on File Prior to Visit   Medication Sig Dispense Refill    busPIRone (BUSPAR) 15 mg tablet TAKE TWO TABLETS BY MOUTH TWICE A DAY **GENERIC FOR: BUSPAR 360 Tab 0    PARoxetine (PAXIL) 40 mg tablet TAKE ONE TABLET BY MOUTH DAILY 90 Tab 3    carBAMazepine (TEGRETOL) 200 mg tablet 800mg in am and 600mg in pm MON and THUR  600mg bid other days  Indications: PREVENT SEIZURES AFTER HEAD TRAUMA OR SURGERY 210 Tab 11    diclofenac EC (VOLTAREN) 75 mg EC tablet TAKE ONE TABLET BY MOUTH TWICE A DAY  WITH FOOD AS NEEDED FOR SHOULDER PAIN. **GENERIC FOR: VOLTAREN 60 Tab 0    acetaminophen (TYLENOL EXTRA STRENGTH) 500 mg tablet Take 2 Tabs by mouth every six (6) hours as needed for Pain.       AVAR-E LS 10-2 % crea       doxycycline (VIBRAMYCIN) 100 mg capsule  multivit-min-FA-K-lycopene (ONE-A-DAY MEN'S 50+ ADVANTAGE) 400- mcg Tab Take  by mouth. No current facility-administered medications on file prior to visit. No Known Allergies  Family History   Problem Relation Age of Onset    Hypertension Mother     Stroke Father     Heart Disease Father     Hypertension Father     Cancer Father      colorectal spread to liver     Heart Disease Paternal Aunt      mi    Heart Disease Paternal Uncle      mi     Social History     Social History    Marital status:      Spouse name: N/A    Number of children: N/A    Years of education: N/A     Social History Main Topics    Smoking status: Former Smoker     Packs/day: 1.50     Years: 10.00     Types: Cigarettes     Quit date: 2/4/2011    Smokeless tobacco: Former User      Comment: chart indicated \"heavy smoker\"    Alcohol use Yes      Comment: rarely    Drug use: No    Sexual activity: Not Asked     Other Topics Concern    None     Social History Narrative             Review of Systems   Constitutional: Negative for chills, diaphoresis, fever, malaise/fatigue and weight loss. Eyes: Negative for blurred vision, double vision, pain and redness. Respiratory: Negative for cough, shortness of breath and wheezing. Cardiovascular: Negative for chest pain, palpitations, orthopnea, claudication, leg swelling and PND. Musculoskeletal: Positive for joint pain (left elbow). Skin: Negative for itching and rash. Neurological: Positive for sensory change (left finger numbness). Negative for dizziness, tingling, tremors, speech change, focal weakness, seizures, loss of consciousness, weakness and headaches.      Results for orders placed or performed in visit on 00/92/81   METABOLIC PANEL, COMPREHENSIVE   Result Value Ref Range    Glucose 91 65 - 99 mg/dL    BUN 18 6 - 24 mg/dL    Creatinine 0.84 0.76 - 1.27 mg/dL    GFR est non- >59 mL/min/1.73    GFR est  >59 mL/min/1.73 BUN/Creatinine ratio 21 (H) 9 - 20    Sodium 141 134 - 144 mmol/L    Potassium 4.5 3.5 - 5.2 mmol/L    Chloride 100 96 - 106 mmol/L    CO2 25 18 - 29 mmol/L    Calcium 9.5 8.7 - 10.2 mg/dL    Protein, total 7.1 6.0 - 8.5 g/dL    Albumin 4.7 3.5 - 5.5 g/dL    GLOBULIN, TOTAL 2.4 1.5 - 4.5 g/dL    A-G Ratio 2.0 1.2 - 2.2    Bilirubin, total 0.3 0.0 - 1.2 mg/dL    Alk.  phosphatase 68 39 - 117 IU/L    AST (SGOT) 21 0 - 40 IU/L    ALT (SGPT) 20 0 - 44 IU/L   LIPID PANEL   Result Value Ref Range    Cholesterol, total 193 100 - 199 mg/dL    Triglyceride 105 0 - 149 mg/dL    HDL Cholesterol 67 >39 mg/dL    VLDL, calculated 21 5 - 40 mg/dL    LDL, calculated 105 (H) 0 - 99 mg/dL   PSA W/ REFLX FREE PSA   Result Value Ref Range    Prostate Specific Ag 0.4 0.0 - 4.0 ng/mL    Reflex Criteria Comment    URINALYSIS W/MICROSCOPIC   Result Value Ref Range    Specific Gravity 1.021 1.005 - 1.030    pH (UA) 6.0 5.0 - 7.5    Color Yellow Yellow    Appearance Clear Clear    Leukocyte Esterase Negative Negative    Protein Negative Negative/Trace    Glucose Negative Negative    Ketone Negative Negative    Blood Negative Negative    Bilirubin Negative Negative    Urobilinogen 0.2 0.2 - 1.0 mg/dL    Nitrites Negative Negative    Microscopic Examination Comment     Microscopic exam See additional order    CBC W/O DIFF   Result Value Ref Range    WBC 5.3 3.4 - 10.8 x10E3/uL    RBC 4.34 4.14 - 5.80 x10E6/uL    HGB 13.7 12.6 - 17.7 g/dL    HCT 41.6 37.5 - 51.0 %    MCV 96 79 - 97 fL    MCH 31.6 26.6 - 33.0 pg    MCHC 32.9 31.5 - 35.7 g/dL    RDW 12.6 12.3 - 15.4 %    PLATELET 768 008 - 348 x10E3/uL   CARBAMAZEPINE   Result Value Ref Range    Carbamazepine 14.6 (HH) 4.0 - 12.0 ug/mL   HEPATITIS C AB   Result Value Ref Range    Hep C Virus Ab <0.1 0.0 - 0.9 s/co ratio   MICROSCOPIC EXAMINATION   Result Value Ref Range    WBC None seen 0 - 5 /hpf    RBC 0-2 0 - 2 /hpf    Epithelial cells None seen 0 - 10 /hpf    Casts None seen None seen /lpf Mucus Present Not Estab. Bacteria None seen None seen/Few   CVD REPORT   Result Value Ref Range    INTERPRETATION Note          Physical Exam  Visit Vitals    /60 (BP 1 Location: Left arm, BP Patient Position: Sitting)    Pulse 62    Temp 98.4 °F (36.9 °C) (Oral)    Resp 18    Ht 5' 6\" (1.676 m)    Wt 204 lb (92.5 kg)    SpO2 97%    BMI 32.93 kg/m2       Head: Normocephalic, without obvious abnormality, atraumatic  Eyes: Conjunctivae does not appear to be jaundiced. Corneas clear. PERRL, EOM's intact. Neck: supple, symmetrical, trachea midline, no adenopathy, thyroid: not enlarged, symmetric, no tenderness/mass/nodules, no carotid bruit and no JVD  Lungs: clear to auscultation bilaterally  Heart: regular rate and rhythm, S1, S2 normal, no murmur, click, rub or gallop  Extremities: extremities normal, atraumatic, no cyanosis or edema  Skin: Skin color, texture, turgor normal. No rashes or lesions. He has a significant tan and it is difficult to tell if this is natural or jaundice. Pulses: 2+ and symmetric  Lymph nodes: Cervical, supraclavicular, and axillary nodes normal.  Neurologic: Grossly normal      ASSESSMENT and PLAN    ICD-10-CM ICD-9-CM    1. Seizures- only one in 1982-due to AVM/ assoc Sx R56.9 780.39    2. Dyslipidemia, goal LDL below 100 E78.5 272.4    3. Jaundice D64 086.5 METABOLIC PANEL, COMPREHENSIVE   4. Left arm pain M79.602 729.5     probable CTS   5. AVM (arteriovenous malformation)left brain-Surgery 1981-spasticity of right arm and leg,seizure in 1982 Q28.2 747.81    6. Convulsions, unspecified convulsion type ([facility] R56.9 780.39    7. Acne vulgaris L70.0 706.1 adapalene (DIFFERIN) 0.3 % topical gel      benzoyl peroxide 5 % external liquid      Sulfacetamide Sodium-Sulfur 10-5 % (w/w) lotion   8. Adjustment disorder with anxious mood F43.22 309.24 venlafaxine-SR (EFFEXOR-XR) 37.5 mg capsule     Diagnoses and all orders for this visit:    1.  Seizures- only one in 1982-due to AVM/ assoc Sx    2. Dyslipidemia, goal LDL below 100    3. Jaundice  -     METABOLIC PANEL, COMPREHENSIVE    4. Left arm pain  Comments:  probable CTS    5. AVM (arteriovenous malformation)left brain-Surgery 1981-spasticity of right arm and leg,seizure in 1982    6. Convulsions, unspecified convulsion type ([facility]    7. Acne vulgaris    8. Adjustment disorder with anxious mood  -     venlafaxine-SR (EFFEXOR-XR) 37.5 mg capsule; Take 1 Cap by mouth daily. One a day 1st week then 2 a day      Follow-up Disposition:  Return in about 1 month (around 9/24/2018), or if jaundice left arm pain/numbness, irritability/ anxiety symptoms worsen or fail to improve, for F/U anxiety. reviewed medications and side effects in detail  Please call my office if there are any questions- [phone]. Discussed expected course/resolution/complications of diagnosis in detail with patient. Medication risks/benefits/costs/interactions/alternatives discussed with patient. Pt was given an after visit summary which includes diagnoses, current medications & vitals. Pt expressed understanding with the diagnosis and plan. Patient to call if left arm pain/numbness are no better in 3 -4 days and prn new problems. Total 25 minutes,60 % counseling re: Will check his bilirubin level. Instructed him to continue the Paxil and add Effexor XR. The benefit/risk, onset of action, method of taking, and side effects of this medication discussed in detail with the patient. He is on the full dose of Buspar for anxiety and should continue that. BMI is significantly elevated- in the obese range. I reviewed diet, exercise and weight control.  Discussed weight control in detail, the importance of mainly decreased carbs, and for weight maintenance, exercise; discussed different diets and that it isn't as important to watch the type of foods as it is to decrease calorie intake no matter what type of diet you do, etc.       Recommended a weekly \"heart check. \" I went into detail how to do this. Regular exercise is very important to your health; it helps mentally, physically, socially; it prevents injuries if done properly. Exercise, even as simple as walking 20-30 minutes daily has major benefits to your health even though your \"numbers\" are the same in the lab. See if you can add this into your daily regimen and after a few months it will become a regular habit-\"just something you do,\" like brushing your teeth. A combination of aerobic exercise and strengthening and stretching is felt to be the best for you, so this should be your ultimate goal.   This can be done in the privacy of your home or in a group setting as at the gym  Some prefer having a , others prefer to do exercise in groups or individually. Do what \"works\" for you. You need to make it simple and \"fun,\" or you most likely you will not continue it. His left arm symptoms sounds suspicious for carpal tunnel syndrome. Instructed him on how to check for that, and if his findings support the diagnosis, he should try wearing a wrist splint at night. If the numbness does not seem to support carpal tunnel he will contact us with that information. Also, discussed symptoms of concern that were noted today in the note above, treatment options( including doing nothing), when to follow up before recommended time frame. Also, answered all questions. Denies suicidal or homicidal ideations. This document was written by Roger Briscoe, as dictated by Ricardo Sweeney MD.  I have reviewed and agree with the above note and have made corrections where appropriate Janusz Alcocer M.D.

## 2018-08-25 LAB
ALBUMIN SERPL-MCNC: 4.7 G/DL (ref 3.5–5.5)
ALBUMIN/GLOB SERPL: 2 {RATIO} (ref 1.2–2.2)
ALP SERPL-CCNC: 64 IU/L (ref 39–117)
ALT SERPL-CCNC: 27 IU/L (ref 0–44)
AST SERPL-CCNC: 33 IU/L (ref 0–40)
BILIRUB SERPL-MCNC: 0.3 MG/DL (ref 0–1.2)
BUN SERPL-MCNC: 14 MG/DL (ref 6–24)
BUN/CREAT SERPL: 15 (ref 9–20)
CALCIUM SERPL-MCNC: 9.5 MG/DL (ref 8.7–10.2)
CHLORIDE SERPL-SCNC: 99 MMOL/L (ref 96–106)
CO2 SERPL-SCNC: 26 MMOL/L (ref 20–29)
CREAT SERPL-MCNC: 0.92 MG/DL (ref 0.76–1.27)
GLOBULIN SER CALC-MCNC: 2.3 G/DL (ref 1.5–4.5)
GLUCOSE SERPL-MCNC: 87 MG/DL (ref 65–99)
POTASSIUM SERPL-SCNC: 4.9 MMOL/L (ref 3.5–5.2)
PROT SERPL-MCNC: 7 G/DL (ref 6–8.5)
SODIUM SERPL-SCNC: 139 MMOL/L (ref 134–144)

## 2018-08-25 NOTE — PROGRESS NOTES
GREAT NEWS!! No diabetes, normal liver and kidney tests.  And you can reassure your wife that you do not have jaundice- the bilirubin in your blood is normal.

## 2018-08-28 ENCOUNTER — TELEPHONE (OUTPATIENT)
Dept: FAMILY MEDICINE CLINIC | Age: 53
End: 2018-08-28

## 2018-08-28 DIAGNOSIS — F43.22 ADJUSTMENT DISORDER WITH ANXIOUS MOOD: ICD-10-CM

## 2018-08-28 NOTE — TELEPHONE ENCOUNTER
Called patient to see if have new pharmacy insurance but was informed what we have on file has . Patient called with care info:   ID [id number]  [facility] [id number]  PCN [id number]  BRENNAN HUNT initiated for venlafaxine, case ID [id number]. Venlafaxine 37.5 two daily was denied. Venlafaxine is available as 75 mg capsule.

## 2018-08-30 RX ORDER — VENLAFAXINE HYDROCHLORIDE 37.5 MG/1
37.5 CAPSULE, EXTENDED RELEASE ORAL DAILY
Qty: 30 CAP | Refills: 0 | Status: SHIPPED | OUTPATIENT
Start: 2018-08-30 | End: 2019-09-20 | Stop reason: DRUGHIGH

## 2018-08-30 NOTE — TELEPHONE ENCOUNTER
Rx signed and sent to pharmacy per verbal order Dr Aquiles Marcos  Left message for patient with instructions

## 2018-08-30 NOTE — TELEPHONE ENCOUNTER
MD Kelsie Mcintosh, MARQUES        Caller: Unspecified (2 days ago,  2:52 PM)                     Change Rx to one a day( pt to follow original instructions and increase to 2 each AM after 1st week ) When has 4-5 days left, call back for 75 mg Rx one a day.

## 2018-09-19 ENCOUNTER — PATIENT MESSAGE (OUTPATIENT)
Dept: FAMILY MEDICINE CLINIC | Age: 53
End: 2018-09-19

## 2018-09-20 RX ORDER — VENLAFAXINE HYDROCHLORIDE 75 MG/1
75 CAPSULE, EXTENDED RELEASE ORAL DAILY
Qty: 90 CAP | Refills: 1 | Status: SHIPPED | OUTPATIENT
Start: 2018-09-20 | End: 2019-03-18 | Stop reason: SDUPTHER

## 2018-12-18 RX ORDER — PAROXETINE HYDROCHLORIDE 40 MG/1
TABLET, FILM COATED ORAL
Qty: 90 TAB | Refills: 2 | Status: SHIPPED | OUTPATIENT
Start: 2018-12-18 | End: 2019-09-14 | Stop reason: SDUPTHER

## 2019-03-18 RX ORDER — VENLAFAXINE HYDROCHLORIDE 75 MG/1
CAPSULE, EXTENDED RELEASE ORAL
Qty: 90 CAP | Refills: 0 | Status: SHIPPED | OUTPATIENT
Start: 2019-03-18 | End: 2019-06-14 | Stop reason: SDUPTHER

## 2019-06-14 RX ORDER — VENLAFAXINE HYDROCHLORIDE 75 MG/1
CAPSULE, EXTENDED RELEASE ORAL
Qty: 90 CAP | Refills: 0 | Status: SHIPPED | OUTPATIENT
Start: 2019-06-14 | End: 2019-09-13 | Stop reason: SDUPTHER

## 2019-09-13 NOTE — TELEPHONE ENCOUNTER
PCP: Pascale Champagne MD    Last appt: 8/24/2018  No future appointments.     Requested Prescriptions     Pending Prescriptions Disp Refills    venlafaxine-SR (EFFEXOR-XR) 75 mg capsule [Pharmacy Med Name: VENLAFAXINE HCL ER 75 MG CAP] 90 Cap 0     Sig: TAKE ONE CAPSULE BY MOUTH DAILY

## 2019-09-16 ENCOUNTER — TELEPHONE (OUTPATIENT)
Dept: FAMILY MEDICINE CLINIC | Age: 54
End: 2019-09-16

## 2019-09-16 RX ORDER — VENLAFAXINE HYDROCHLORIDE 75 MG/1
CAPSULE, EXTENDED RELEASE ORAL
Qty: 90 CAP | Refills: 0 | Status: SHIPPED | OUTPATIENT
Start: 2019-09-16 | End: 2019-09-20 | Stop reason: SDUPTHER

## 2019-09-16 RX ORDER — PAROXETINE HYDROCHLORIDE 40 MG/1
TABLET, FILM COATED ORAL
Qty: 90 TAB | Refills: 1 | Status: SHIPPED | OUTPATIENT
Start: 2019-09-16 | End: 2020-03-11

## 2019-09-16 NOTE — TELEPHONE ENCOUNTER
Pharmacy is calling to get clarification about the following Rx:  venlafaxine-SR [facility] 75 mg capsule [[id number]]     You need to know if pt need the brand name or can the pt get the generic brand

## 2019-09-16 NOTE — TELEPHONE ENCOUNTER
Needs to schedule an  office visit. Once visit is scheduled we can refill medication until appointment.

## 2019-09-16 NOTE — TELEPHONE ENCOUNTER
----- Message from Landy Hall sent at 9/16/2019  8:55 AM EDT -----  Regarding: Blank Lu pt spouse returned a phone call from [place].  Best contact number is [phone]

## 2019-09-20 ENCOUNTER — OFFICE VISIT (OUTPATIENT)
Dept: FAMILY MEDICINE CLINIC | Age: 54
End: 2019-09-20

## 2019-09-20 VITALS
SYSTOLIC BLOOD PRESSURE: 138 MMHG | OXYGEN SATURATION: 98 % | TEMPERATURE: 98.5 F | RESPIRATION RATE: 18 BRPM | HEIGHT: 66 IN | BODY MASS INDEX: 33.75 KG/M2 | DIASTOLIC BLOOD PRESSURE: 80 MMHG | WEIGHT: 210 LBS | HEART RATE: 82 BPM

## 2019-09-20 DIAGNOSIS — F43.23 ADJUSTMENT DISORDER WITH MIXED ANXIETY AND DEPRESSED MOOD: ICD-10-CM

## 2019-09-20 DIAGNOSIS — M19.011 PRIMARY OSTEOARTHRITIS OF RIGHT SHOULDER: ICD-10-CM

## 2019-09-20 DIAGNOSIS — Z85.828 HISTORY OF SKIN CANCER: ICD-10-CM

## 2019-09-20 DIAGNOSIS — F43.22 ADJUSTMENT DISORDER WITH ANXIOUS MOOD: ICD-10-CM

## 2019-09-20 DIAGNOSIS — R56.9 SEIZURES (HCC): Primary | ICD-10-CM

## 2019-09-20 DIAGNOSIS — E78.5 DYSLIPIDEMIA, GOAL LDL BELOW 100: ICD-10-CM

## 2019-09-20 DIAGNOSIS — L70.0 ACNE VULGARIS: ICD-10-CM

## 2019-09-20 RX ORDER — BUSPIRONE HYDROCHLORIDE 15 MG/1
TABLET ORAL
Qty: 360 TAB | Refills: 3 | Status: SHIPPED | OUTPATIENT
Start: 2019-09-20 | End: 2020-03-06

## 2019-09-20 RX ORDER — VENLAFAXINE HYDROCHLORIDE 75 MG/1
CAPSULE, EXTENDED RELEASE ORAL
Qty: 90 CAP | Refills: 0 | Status: SHIPPED | OUTPATIENT
Start: 2019-09-20 | End: 2020-03-06

## 2019-09-20 RX ORDER — CARBAMAZEPINE 200 MG/1
TABLET ORAL
Qty: 210 TAB | Refills: 11 | Status: SHIPPED | OUTPATIENT
Start: 2019-09-20 | End: 2021-11-11 | Stop reason: SDUPTHER

## 2019-09-20 NOTE — PROGRESS NOTES
No chief complaint on file. 1. Have you been to the ER, urgent care clinic since your last visit? Hospitalized since your last visit? No    2. Have you seen or consulted any other health care providers outside of the [address] [place] [address] since your last visit? Include any pap smears or colon screening.  No

## 2019-09-20 NOTE — PROGRESS NOTES
HISTORY OF PRESENT ILLNESS  HPI  Omi Fuentes is a 52 y.o. Male with a history of AVM (1981), adjustment disorder with anxiety and depression, seizures, convulsions, osteoarthritis of right shoulder and skin cancer, who presents to the office today for a follow up on his medication. Pt says he went to the [facility] ER in August for a swollen leg, but there turned out to be no issue- no signs of a clot that his wife has   Pt denies unusual SOB, chest pain, and any ER visits or hospitalizations since August.          Past Medical History:   Diagnosis Date    Adjustment disorder with mixed anxiety and depressed mood     AVM (arteriovenous malformation)left brain-Surgery 1981-spasticity of right arm and leg,seizure 1982 7676/[id number]/2584    Clicking right shoulder 4/6/2015    Dyslipidemia, goal LDL below 100 9/20/2019    History of skin cancer 4/6/2015    Primary osteoarthritis of right shoulder- on 12/2015 X ray 1/9/2016    Seizures ([address]     last seizure 1982     Past Surgical History:   Procedure Laterality Date    HX HEENT      wisdom teeth    HX TONSIL AND ADENOIDECTOMY      HX TONSILLECTOMY      NEUROLOGICAL PROCEDURE UNLISTED  22 yo   1981    L side brain surgery/ arteriovenous malformation     Current Outpatient Medications on File Prior to Visit   Medication Sig Dispense Refill    PARoxetine (PAXIL) 40 mg tablet TAKE ONE TABLET BY MOUTH DAILY 90 Tab 1    adapalene (DIFFERIN) 0.3 % topical gel       benzoyl peroxide 5 % external liquid       Sulfacetamide Sodium-Sulfur 10-5 % (w/w) lotion       diclofenac EC (VOLTAREN) 75 mg EC tablet TAKE ONE TABLET BY MOUTH TWICE A DAY  WITH FOOD AS NEEDED FOR SHOULDER PAIN. **GENERIC FOR: VOLTAREN 60 Tab 0    acetaminophen (TYLENOL EXTRA STRENGTH) 500 mg tablet Take 2 Tabs by mouth every six (6) hours as needed for Pain.       AVAR-E LS 10-2 % crea       doxycycline (VIBRAMYCIN) 100 mg capsule       multivit-min-FA-K-lycopene (ONE-A-DAY MEN'S 50+ ADVANTAGE) 400- mcg Tab Take  by mouth. No current facility-administered medications on file prior to visit. No Known Allergies  Family History   Problem Relation Age of Onset    Hypertension Mother     Stroke Father     Heart Disease Father     Hypertension Father     Cancer Father         colorectal spread to liver     Heart Disease Paternal Aunt         mi    Heart Disease Paternal Uncle         mi     Social History     Socioeconomic History    Marital status:      Spouse name: Not on file    Number of children: Not on file    Years of education: Not on file    Highest education level: Not on file   Tobacco Use    Smoking status: Former Smoker     Packs/day: 1.50     Years: 10.00     Pack years: 15.00     Types: Cigarettes     Last attempt to quit: 2011     Years since quittin.6    Smokeless tobacco: Former User    Tobacco comment: chart indicated \"heavy smoker\"   Substance and Sexual Activity    Alcohol use: Yes     Comment: rarely    Drug use: No             Review of Systems   Constitutional: Negative for chills, diaphoresis, fever, malaise/fatigue and weight loss. Eyes: Negative for blurred vision, double vision, pain and redness. Respiratory: Negative for cough, shortness of breath and wheezing. Cardiovascular: Negative for chest pain, palpitations, orthopnea, claudication, leg swelling and PND. Skin: Negative for itching and rash. Neurological: Negative for dizziness, tingling, tremors, sensory change, speech change, focal weakness, seizures, loss of consciousness, weakness and headaches.      Results for orders placed or performed in visit on    METABOLIC PANEL, COMPREHENSIVE   Result Value Ref Range    Glucose 87 65 - 99 mg/dL    BUN 14 6 - 24 mg/dL    Creatinine 0.92 0.76 - 1.27 mg/dL    GFR est non-AA 94 >59 mL/min/1.73    GFR est  >59 mL/min/1.73    BUN/Creatinine ratio 15 9 - 20    Sodium 139 134 - 144 mmol/L    Potassium 4.9 3.5 - 5.2 mmol/L    Chloride 99 96 - 106 mmol/L    CO2 26 20 - 29 mmol/L    Calcium 9.5 8.7 - 10.2 mg/dL    Protein, total 7.0 6.0 - 8.5 g/dL    Albumin 4.7 3.5 - 5.5 g/dL    GLOBULIN, TOTAL 2.3 1.5 - 4.5 g/dL    A-G Ratio 2.0 1.2 - 2.2    Bilirubin, total 0.3 0.0 - 1.2 mg/dL    Alk. phosphatase 64 39 - 117 IU/L    AST (SGOT) 33 0 - 40 IU/L    ALT (SGPT) 27 0 - 44 IU/L         Physical Exam  Visit Vitals  /80 (BP 1 Location: Left arm, BP Patient Position: Sitting)   Pulse 82   Temp 98.5 °F (36.9 °C) (Oral)   Resp 18   Ht 5' 6\" (1.676 m)   Wt 210 lb (95.3 kg)   SpO2 98%   BMI 33.89 kg/m²       Head: Normocephalic, without obvious abnormality, atraumatic  Eyes: conjunctivae/corneas clear. PERRL, EOM's intact. Neck: supple, symmetrical, trachea midline, no adenopathy, thyroid: not enlarged, symmetric, no tenderness/mass/nodules, no carotid bruit and no JVD  Lungs: clear to auscultation bilaterally  Heart: regular rate and rhythm, S1, S2 normal, no murmur, click, rub or gallop  Extremities: extremities normal, atraumatic, no cyanosis or edema  Pulses: 2+ and symmetric  Lymph nodes: Cervical, supraclavicular, and axillary nodes normal.  Neurologic: Grossly normal. Pt has his baseline spasticity when he walks or shakes hands, there is no change in this. ASSESSMENT and PLAN    ICD-10-CM ICD-9-CM    1. Seizures- only one in 1982-due to AVM/ assoc Sx R56.9 780.39 carBAMazepine (TEGRETOL) 200 mg tablet      CBC W/O DIFF      METABOLIC PANEL, COMPREHENSIVE      CARBAMAZEPINE   2. Adjustment disorder with anxious mood F43.22 309.24 venlafaxine-SR (EFFEXOR-XR) 75 mg capsule      busPIRone (BUSPAR) 15 mg tablet   3. Dyslipidemia, goal LDL below 100 E78.5 272.4    4. Primary osteoarthritis of right shoulder- on 12/2015 X ray M19.011 715.11    5. Acne vulgaris L70.0 706.1    6. History of skin cancer Z85.828 V10.83    7.  Adjustment disorder with mixed anxiety and depressed mood F43.23 309.28      Diagnoses and all orders for this visit:    1. Seizures- only one in 1982-due to AVM/ assoc Sx  -     carBAMazepine (TEGRETOL) 200 mg tablet; 800mg in am and 600mg in pm MON and THUR  600mg bid other days  Indications: Prevention of Seizures following Head Injury or Surgery  -     CBC W/O DIFF  -     METABOLIC PANEL, COMPREHENSIVE  -     CARBAMAZEPINE    2. Adjustment disorder with anxious mood  -     venlafaxine-SR (EFFEXOR-XR) 75 mg capsule; TAKE ONE CAPSULE BY MOUTH DAILY  -     busPIRone (BUSPAR) 15 mg tablet; TAKE TWO TABLETS BY MOUTH TWICE A DAY **GENERIC FOR: BUSPAR    3. Dyslipidemia, goal LDL below 100    4. Primary osteoarthritis of right shoulder- on 12/2015 X ray    5. Acne vulgaris    6. History of skin cancer    7. Adjustment disorder with mixed anxiety and depressed mood          lab results and schedule of future lab studies reviewed with patient  reviewed diet, exercise and weight control  cardiovascular risk and specific lipid/LDL goals reviewed  reviewed medications and side effects in detail  Please call my office if there are any questions- [phone]. I will arrange for follow up after the lab tests done from today return  Recommended a weekly \"heart check. \" I went into detail how to do this. Call for refills on medications as needed. Discussed expected course/resolution/complications of diagnosis in detail with patient. Medication risks/benefits/costs/interactions/alternatives discussed with patient. Pt was given an after visit summary which includes diagnoses, current medications & vitals. Pt expressed understanding with the diagnosis and plan. Total 25 minutes,60 % counseling re:   Recommended a weekly \"heart check. \" I went into detail how to do this. Regular exercise is very important to your health; it helps mentally, physically, socially; it prevents injuries if done properly.   Exercise, even as simple as walking 20-30 minutes daily has major benefits to your health even though your \"numbers\" are the same in the lab. See if you can add this into your daily regimen and after a few months it will become a regular habit-\"just something you do,\" like brushing your teeth. A combination of aerobic exercise and strengthening and stretching is felt to be the best for you, so this should be your ultimate goal.   This can be done in the privacy of your home or in a group setting as at the gym  Some prefer having a , others prefer to do exercise in groups or individually. Do what \"works\" for you. You need to make it simple and \"fun,\" or you most likely you will not continue it. BMI is significantly elevated- in the obese range. I reviewed diet, exercise and weight control. Discussed weight control in detail, the importance of mainly decreased carbs, and for weight maintenance, exercise; discussed different diets and that it isn't as important to watch the type of foods as it is to decrease calorie intake no matter what type of diet you do, etc.      Reminded him to continue being aggressive w diet and exercise. Reviewed symptoms, or lack thereof, of hypertension and elevated cholesterol. Also, discussed symptoms of concern that were noted today in the note above, treatment options( including doing nothing), when to follow up before recommended time frame. Also, answered all questions. This document was written by Savanah Ramos, as dictated by Jeovanny Dominguez MD.  I have reviewed and agree with the above note and have made corrections where appropriate Janusz Jade M.D.

## 2019-09-21 LAB
ALBUMIN SERPL-MCNC: 4.8 G/DL (ref 3.5–5.5)
ALBUMIN/GLOB SERPL: 2.2 {RATIO} (ref 1.2–2.2)
ALP SERPL-CCNC: 68 IU/L (ref 39–117)
ALT SERPL-CCNC: 22 IU/L (ref 0–44)
AST SERPL-CCNC: 28 IU/L (ref 0–40)
BILIRUB SERPL-MCNC: 0.2 MG/DL (ref 0–1.2)
BUN SERPL-MCNC: 21 MG/DL (ref 6–24)
BUN/CREAT SERPL: 21 (ref 9–20)
CALCIUM SERPL-MCNC: 9.6 MG/DL (ref 8.7–10.2)
CARBAMAZEPINE SERPL-MCNC: 10.4 UG/ML (ref 4–12)
CHLORIDE SERPL-SCNC: 101 MMOL/L (ref 96–106)
CO2 SERPL-SCNC: 25 MMOL/L (ref 20–29)
CREAT SERPL-MCNC: 0.99 MG/DL (ref 0.76–1.27)
ERYTHROCYTE [DISTWIDTH] IN BLOOD BY AUTOMATED COUNT: 12.1 % (ref 12.3–15.4)
GLOBULIN SER CALC-MCNC: 2.2 G/DL (ref 1.5–4.5)
GLUCOSE SERPL-MCNC: 86 MG/DL (ref 65–99)
HCT VFR BLD AUTO: 39.1 % (ref 37.5–51)
HGB BLD-MCNC: 13.3 G/DL (ref 13–17.7)
MCH RBC QN AUTO: 31.8 PG (ref 26.6–33)
MCHC RBC AUTO-ENTMCNC: 34 G/DL (ref 31.5–35.7)
MCV RBC AUTO: 94 FL (ref 79–97)
PLATELET # BLD AUTO: 309 X10E3/UL (ref 150–450)
POTASSIUM SERPL-SCNC: 4.1 MMOL/L (ref 3.5–5.2)
PROT SERPL-MCNC: 7 G/DL (ref 6–8.5)
RBC # BLD AUTO: 4.18 X10E6/UL (ref 4.14–5.8)
SODIUM SERPL-SCNC: 140 MMOL/L (ref 134–144)
WBC # BLD AUTO: 7.1 X10E3/UL (ref 3.4–10.8)

## 2019-09-23 NOTE — PROGRESS NOTES
GREAT NEWS!! All of your recent lab tests are normal or at goal.  No diabetes, normal liver and kidney tests. Good Tegretol level I would continue everything the same and schedule a yearly physical and we can do seizure medication monitoring at that time.

## 2019-12-12 RX ORDER — VENLAFAXINE HYDROCHLORIDE 75 MG/1
CAPSULE, EXTENDED RELEASE ORAL
Qty: 90 CAP | Refills: 2 | Status: SHIPPED | OUTPATIENT
Start: 2019-12-12 | End: 2020-03-06

## 2019-12-12 NOTE — TELEPHONE ENCOUNTER
PCP: Shanika Logan MD    Last appt: 9/20/2019  No future appointments.     Requested Prescriptions     Pending Prescriptions Disp Refills    venlafaxine-SR (EFFEXOR-XR) 75 mg capsule [Pharmacy Med Name: VENLAFAXINE HCL ER 75 MG CAP] 90 Cap 0     Sig: TAKE ONE CAPSULE BY MOUTH DAILY

## 2020-03-06 ENCOUNTER — TELEPHONE (OUTPATIENT)
Dept: FAMILY MEDICINE CLINIC | Age: 55
End: 2020-03-06

## 2020-03-06 ENCOUNTER — OFFICE VISIT (OUTPATIENT)
Dept: FAMILY MEDICINE CLINIC | Age: 55
End: 2020-03-06

## 2020-03-06 VITALS
OXYGEN SATURATION: 97 % | HEIGHT: 66 IN | RESPIRATION RATE: 16 BRPM | WEIGHT: 210 LBS | DIASTOLIC BLOOD PRESSURE: 70 MMHG | TEMPERATURE: 98.1 F | SYSTOLIC BLOOD PRESSURE: 120 MMHG | HEART RATE: 60 BPM | BODY MASS INDEX: 33.75 KG/M2

## 2020-03-06 DIAGNOSIS — Z00.00 GENERAL MEDICAL EXAMINATION: Primary | ICD-10-CM

## 2020-03-06 DIAGNOSIS — E55.9 VITAMIN D DEFICIENCY: ICD-10-CM

## 2020-03-06 DIAGNOSIS — Z12.5 SCREENING PSA (PROSTATE SPECIFIC ANTIGEN): ICD-10-CM

## 2020-03-06 DIAGNOSIS — J06.9 VIRAL URI: ICD-10-CM

## 2020-03-06 RX ORDER — BUSPIRONE HYDROCHLORIDE 15 MG/1
TABLET ORAL
COMMUNITY
Start: 2019-09-20 | End: 2020-09-16

## 2020-03-06 RX ORDER — TIMOLOL MALEATE 5 MG/ML
SOLUTION OPHTHALMIC
COMMUNITY
Start: 2020-02-06 | End: 2020-03-06 | Stop reason: SDUPTHER

## 2020-03-06 RX ORDER — TIMOLOL MALEATE 5 MG/ML
SOLUTION OPHTHALMIC
COMMUNITY
Start: 2019-08-12

## 2020-03-06 RX ORDER — PAROXETINE HYDROCHLORIDE 40 MG/1
TABLET, FILM COATED ORAL
COMMUNITY
Start: 2019-09-16 | End: 2020-03-06 | Stop reason: SDUPTHER

## 2020-03-06 RX ORDER — CLOBETASOL PROPIONATE 0.5 MG/G
CREAM TOPICAL
COMMUNITY

## 2020-03-06 RX ORDER — CARBAMAZEPINE 200 MG/1
TABLET ORAL
COMMUNITY
Start: 2019-09-20 | End: 2020-03-06 | Stop reason: SDUPTHER

## 2020-03-06 RX ORDER — DOXYCYCLINE 100 MG/1
CAPSULE ORAL
COMMUNITY
Start: 2019-09-08 | End: 2020-03-06 | Stop reason: SDUPTHER

## 2020-03-06 NOTE — PROGRESS NOTES
Chief Complaint   Patient presents with    Complete Physical     fasting     Sore Throat     nasal drainage      1. Have you been to the ER, urgent care clinic since your last visit? Hospitalized since your last visit? No    2. Have you seen or consulted any other health care providers outside of the [address] [place] [address] since your last visit? Include any pap smears or colon screening.  No

## 2020-03-06 NOTE — PROGRESS NOTES
Subjective:     René Oquendo is a 62 y.o. male presenting for annual exam and complete physical.    Patient Active Problem List   Diagnosis Code    Seizures- only one in 1982-due to AVM/ assoc Sx R56.9    Adjustment disorder with mixed anxiety and depressed mood F43.23    History of skin cancer Z85.828    [address]- had only one total- in 1982 when had AVM and assoc surgery R56.9    Acne vulgaris L70.0    AVM (arteriovenous malformation)left brain-Surgery 1981-spasticity of right arm and leg,seizure in 1982 Q28.2    Primary osteoarthritis of right shoulder- on 12/2015 X ray M19.011    Dyslipidemia, goal LDL below 100 E78.5     Patient Active Problem List    Diagnosis Date Noted    Dyslipidemia, goal LDL below 100 09/20/2019    Primary osteoarthritis of right shoulder- on 12/2015 X ray 01/09/2016    AVM (arteriovenous malformation)left brain-Surgery 1981-spasticity of right arm and leg,seizure in 1982 12/22/2015    Convulsion ([facility]- had only one total- in 1982 when had AVM and assoc surgery 05/28/2015    Acne vulgaris 05/28/2015    History of skin cancer 04/06/2015    Adjustment disorder with mixed anxiety and depressed mood     Seizures- only one in 1982-due to AVM/ assoc Sx      Current Outpatient Medications   Medication Sig Dispense Refill    clobetasoL (TEMOVATE) 0.05 % topical cream Apply  to affected area.       timolol (TIMOPTIC-XE) 0.5 % ophthalmic gel-forming       busPIRone (BUSPAR) 15 mg tablet       carBAMazepine (TEGRETOL) 200 mg tablet 800mg in am and 600mg in pm MON and THUR  600mg bid other days  Indications: Prevention of Seizures following Head Injury or Surgery 210 Tab 11    PARoxetine (PAXIL) 40 mg tablet TAKE ONE TABLET BY MOUTH DAILY 90 Tab 1    adapalene (DIFFERIN) 0.3 % topical gel       benzoyl peroxide 5 % external liquid       Sulfacetamide Sodium-Sulfur 10-5 % (w/w) lotion       diclofenac EC (VOLTAREN) 75 mg EC tablet TAKE ONE TABLET BY MOUTH TWICE A DAY  WITH FOOD AS NEEDED FOR SHOULDER PAIN. **GENERIC FOR: VOLTAREN 60 Tab 0    acetaminophen (TYLENOL EXTRA STRENGTH) 500 mg tablet Take 2 Tabs by mouth every six (6) hours as needed for Pain.  AVAR-E LS 10-2 % crea       doxycycline (VIBRAMYCIN) 100 mg capsule Take 100 mg by mouth daily.  multivit-min-FA-K-lycopene (ONE-A-DAY MEN'S 50+ ADVANTAGE) 400- mcg Tab Take  by mouth. No Known Allergies  Past Medical History:   Diagnosis Date    Adjustment disorder with mixed anxiety and depressed mood     AVM (arteriovenous malformation)left brain-Surgery -spasticity of right arm and leg,seizure [id number]    Clicking right shoulder 2015    Dyslipidemia, goal LDL below 100 2019    History of skin cancer 2015    Primary osteoarthritis of right shoulder- on 2015 X ray 2016    Seizures ([address]     last seizure      Past Surgical History:   Procedure Laterality Date    HX HEENT      wisdom teeth    HX TONSIL AND ADENOIDECTOMY      HX TONSILLECTOMY      NEUROLOGICAL PROCEDURE UNLISTED  22 yo       L side brain surgery/ arteriovenous malformation     Family History   Problem Relation Age of Onset    Hypertension Mother     Stroke Father     Heart Disease Father     Hypertension Father     Cancer Father         colorectal spread to liver     Heart Disease Paternal Aunt         mi    Heart Disease Paternal Uncle         mi     Social History     Tobacco Use    Smoking status: Former Smoker     Packs/day: 1.50     Years: 10.00     Pack years: 15.00     Types: Cigarettes     Last attempt to quit: 2011     Years since quittin.0    Smokeless tobacco: Former User    Tobacco comment: chart indicated \"heavy smoker\"   Substance Use Topics    Alcohol use: Yes     Comment: rarely      C/O scratchy throat  nasal congestion with clear nasal discharge x 2 days.  Denies cough and purulent nasal discharge, denies chills and fever   Seizure is managed by neurologist      Review of Systems  A comprehensive review of systems was negative except for that written in the HPI. Objective:     Visit Vitals  /70 (BP 1 Location: Left arm, BP Patient Position: Sitting)   Pulse 60   Temp 98.1 °F (36.7 °C) (Oral)   Resp 16   Ht 5' 6\" (1.676 m)   Wt 210 lb (95.3 kg)   SpO2 97%   BMI 33.89 kg/m²     Physical exam:   General appearance - alert, well appearing, and in no distress  Mental status - alert, oriented to person, place, and time  Eyes - pupils equal and reactive, extraocular eye movements intact  Ears - bilateral TM's and external ear canals normal  Nose - slightly erythematous , normal and patent,   Mouth - mucous membranes moist, pharynx normal without lesions  Neck - supple, no significant adenopathy  Lymphatics - no palpable lymphadenopathy, no hepatosplenomegaly  Chest - clear to auscultation, no wheezes, rales or rhonchi, symmetric air entry  Heart - normal rate, regular rhythm, normal S1, S2, no murmurs, rubs, clicks or gallops  Abdomen - soft, nontender, nondistended, no masses or organomegaly  Back exam - full range of motion, no tenderness, palpable spasm or pain on motion  Neurological - alert, oriented, normal speech, no focal findings or movement disorder noted  Musculoskeletal - no joint tenderness, deformity or swelling  Extremities - peripheral pulses normal, no pedal edema, no clubbing or cyanosis  Skin - normal coloration and turgor, no rashes, no suspicious skin lesions noted     Assessment/Plan:       lose weight, increase physical activity, follow low fat diet, follow low salt diet, routine labs ordered. ICD-10-CM ICD-9-CM    1. General medical examination Z00.00 V70.9 LIPID PANEL      METABOLIC PANEL, COMPREHENSIVE      CBC W/O DIFF      T4, FREE      TSH 3RD GENERATION   2. Vitamin D deficiency E55.9 268.9 VITAMIN D, 25 HYDROXY   3. Screening PSA (prostate specific antigen) Z12.5 V76.44 PSA W/ REFLX FREE PSA   4.  Viral URI J06.9 465.9 Heath King

## 2020-03-06 NOTE — TELEPHONE ENCOUNTER
----- Message from Dina Golden sent at 3/6/2020 12:58 PM EST -----  Regarding: Dr. Thornton Day: [phone]  Caller's first and last name: Rod Villalobos (Spouse)  Reason for call: [facility] had questions about what took place at pt's appt.    Callback required yes/no and why: Yes  Best contact number(s): [phone]  Details to clarify the request: n/a

## 2020-03-07 LAB
25(OH)D3+25(OH)D2 SERPL-MCNC: 38.9 NG/ML (ref 30–100)
ALBUMIN SERPL-MCNC: 4.8 G/DL (ref 3.8–4.9)
ALBUMIN/GLOB SERPL: 2.1 {RATIO} (ref 1.2–2.2)
ALP SERPL-CCNC: 66 IU/L (ref 39–117)
ALT SERPL-CCNC: 23 IU/L (ref 0–44)
AST SERPL-CCNC: 25 IU/L (ref 0–40)
BILIRUB SERPL-MCNC: 0.3 MG/DL (ref 0–1.2)
BUN SERPL-MCNC: 14 MG/DL (ref 6–24)
BUN/CREAT SERPL: 17 (ref 9–20)
CALCIUM SERPL-MCNC: 9.6 MG/DL (ref 8.7–10.2)
CHLORIDE SERPL-SCNC: 104 MMOL/L (ref 96–106)
CHOLEST SERPL-MCNC: 182 MG/DL (ref 100–199)
CO2 SERPL-SCNC: 26 MMOL/L (ref 20–29)
CREAT SERPL-MCNC: 0.81 MG/DL (ref 0.76–1.27)
ERYTHROCYTE [DISTWIDTH] IN BLOOD BY AUTOMATED COUNT: 11.7 % (ref 11.6–15.4)
GLOBULIN SER CALC-MCNC: 2.3 G/DL (ref 1.5–4.5)
GLUCOSE SERPL-MCNC: 84 MG/DL (ref 65–99)
HCT VFR BLD AUTO: 37.7 % (ref 37.5–51)
HDLC SERPL-MCNC: 55 MG/DL
HGB BLD-MCNC: 13.3 G/DL (ref 13–17.7)
INTERPRETATION, 910389: NORMAL
LDLC SERPL CALC-MCNC: 105 MG/DL (ref 0–99)
MCH RBC QN AUTO: 32.7 PG (ref 26.6–33)
MCHC RBC AUTO-ENTMCNC: 35.3 G/DL (ref 31.5–35.7)
MCV RBC AUTO: 93 FL (ref 79–97)
PLATELET # BLD AUTO: 286 X10E3/UL (ref 150–450)
POTASSIUM SERPL-SCNC: 4.6 MMOL/L (ref 3.5–5.2)
PROT SERPL-MCNC: 7.1 G/DL (ref 6–8.5)
PSA SERPL-MCNC: 0.7 NG/ML (ref 0–4)
RBC # BLD AUTO: 4.07 X10E6/UL (ref 4.14–5.8)
REFLEX CRITERIA: NORMAL
SODIUM SERPL-SCNC: 144 MMOL/L (ref 134–144)
T4 FREE SERPL-MCNC: 0.95 NG/DL (ref 0.82–1.77)
TRIGL SERPL-MCNC: 110 MG/DL (ref 0–149)
TSH SERPL DL<=0.005 MIU/L-ACNC: 0.68 UIU/ML (ref 0.45–4.5)
VLDLC SERPL CALC-MCNC: 22 MG/DL (ref 5–40)
WBC # BLD AUTO: 6.3 X10E3/UL (ref 3.4–10.8)

## 2020-03-11 RX ORDER — PAROXETINE HYDROCHLORIDE 40 MG/1
TABLET, FILM COATED ORAL
Qty: 90 TAB | Refills: 1 | Status: SHIPPED | OUTPATIENT
Start: 2020-03-11 | End: 2020-09-05 | Stop reason: SDUPTHER

## 2020-03-11 NOTE — TELEPHONE ENCOUNTER
PCP: Laverne Dutton MD    Last appt: 3/6/2020  No future appointments.     Requested Prescriptions     Pending Prescriptions Disp Refills    PARoxetine (PAXIL) 40 mg tablet [Pharmacy Med Name: PARoxetine HCL 40 MG TABLET] 90 Tab 0     Sig: TAKE ONE TABLET BY MOUTH DAILY

## 2020-09-05 RX ORDER — PAROXETINE HYDROCHLORIDE 40 MG/1
TABLET, FILM COATED ORAL
Qty: 90 TAB | Refills: 0 | Status: SHIPPED | OUTPATIENT
Start: 2020-09-05 | End: 2020-09-10

## 2020-09-10 RX ORDER — PAROXETINE HYDROCHLORIDE 40 MG/1
TABLET, FILM COATED ORAL
Qty: 90 TAB | Refills: 1 | Status: SHIPPED | OUTPATIENT
Start: 2020-09-10 | End: 2021-05-21

## 2020-09-10 NOTE — TELEPHONE ENCOUNTER
PCP: Zahra Lazcano MD    Last appt: 3/6/2020  No future appointments.     Requested Prescriptions     Pending Prescriptions Disp Refills    PARoxetine (PAXIL) 40 mg tablet [Pharmacy Med Name: PARoxetine HCL 40 MG TABLET] 90 Tab 0     Sig: TAKE ONE TABLET BY MOUTH DAILY

## 2020-09-14 NOTE — TELEPHONE ENCOUNTER
PCP: Roderick La MD    Last appt: 3/6/2020  No future appointments.     Requested Prescriptions     Pending Prescriptions Disp Refills    busPIRone (BUSPAR) 15 mg tablet [Pharmacy Med Name: busPIRone HCL 15 MG TABLET] 360 Tab 2     Sig: TAKE 2 TABLETS BY MOUTH TWO TIMES A DAY

## 2020-09-16 RX ORDER — BUSPIRONE HYDROCHLORIDE 15 MG/1
TABLET ORAL
Qty: 360 TAB | Refills: 2 | Status: SHIPPED | OUTPATIENT
Start: 2020-09-16 | End: 2021-08-04

## 2020-09-16 NOTE — TELEPHONE ENCOUNTER
----- Message from Ana Flores sent at 9/16/2020  8:58 AM EDT -----  Regarding: /Refill  Caller (if not patient): Von Sanders  Relationship of caller (if not patient):wife  Best contact number(s): [phone]  Name of medication and dosage if known: on patient chart  Is patient out of this medication (yes/no): yes  [facility] listed in chart? (yes/no): yes  Pharmacy phone number:  Date of last visit:3/6/2020  Details to clarify the request: Caller stated that the office called her and told her that pt. needed to come in to office for a med refill appt. before medication is refilled but pt. is currently [place] [facility] [place] of [place] . HE is currently schedule for an appt. for 10/6/2020, but pt needs his medications refilled as soon as possible.

## 2020-09-28 ENCOUNTER — OFFICE VISIT (OUTPATIENT)
Dept: FAMILY MEDICINE CLINIC | Age: 55
End: 2020-09-28

## 2020-09-28 VITALS
RESPIRATION RATE: 16 BRPM | OXYGEN SATURATION: 99 % | HEIGHT: 66 IN | TEMPERATURE: 98.9 F | SYSTOLIC BLOOD PRESSURE: 128 MMHG | HEART RATE: 69 BPM | DIASTOLIC BLOOD PRESSURE: 70 MMHG | WEIGHT: 210.4 LBS | BODY MASS INDEX: 33.82 KG/M2

## 2020-09-28 DIAGNOSIS — J69.0 ASPIRATION PNEUMONIA, UNSPECIFIED ASPIRATION PNEUMONIA TYPE, UNSPECIFIED LATERALITY, UNSPECIFIED PART OF LUNG (HCC): ICD-10-CM

## 2020-09-28 DIAGNOSIS — Z09 HOSPITAL DISCHARGE FOLLOW-UP: Primary | ICD-10-CM

## 2020-09-28 DIAGNOSIS — R56.9 SEIZURES (HCC): ICD-10-CM

## 2020-09-28 DIAGNOSIS — Z98.1 S/P SPINAL FUSION: ICD-10-CM

## 2020-09-28 DIAGNOSIS — T42.1X1S: ICD-10-CM

## 2020-09-28 DIAGNOSIS — R03.0 ELEVATED BLOOD PRESSURE READING: ICD-10-CM

## 2020-09-28 PROCEDURE — 99214 OFFICE O/P EST MOD 30 MIN: CPT | Performed by: FAMILY MEDICINE

## 2020-09-28 RX ORDER — VENLAFAXINE 75 MG/1
75 TABLET ORAL
COMMUNITY
End: 2021-11-08

## 2020-09-28 NOTE — PROGRESS NOTES
[facility]      Subjective:     Chief Complaint   Patient presents with   [facility] Follow Up     Gillis Boast 9/14- 9/21       Rosi Henriquez is a 62y.o. year old male who presents for evaluation of the following:    . Hospital follow-up:  Outside facility  Reason for admission: surgical anesthesia complication  Discharge summary not available at time of visit. Imaging, labs, provider notes, discharge summary reviewed. Pertinent findings:    Interval events:  Seziure Disorder:   Chornic  Had an interreaction of tegretal with anestesia with elevated level of tegretal. Has IV fluids inpatient then levels normalized  -No inpatient records available  Tx: Trgeretal   Managed by neurology  Changed 12/2019  No rechekc since then on file    Aspiration Pnuemonia  Compelted Flagyl and Omnicef after admission  occurred inpatient after intubation during surgery  Had coughing and \"muscle spasms\" improved  Has swallow study done with nectar thick pureed diet and told to [facility]  In 1 month with repeat test for monitoring    BELLA on CPAP  Managed by specialist    Spinal Stenosis Cervical   Spinal Fusion  Tx: oxycodone, robaxine, narcan  Follow-up appointment with surgery Fir[facility]  Current pain rating 6/10    Elevated Blood Pressure Reading:  States treated inpatient but no prescriptions going home  BP Readings from Last 3 Encounters:   09/28/20 128/70   03/06/20 120/70   09/20/19 138/80         Review of Systems   Pertinent positives and negative per HPI. All other systems  reviewed are negative for a Comprehensive ROS (10+).        Past Medical History:   Diagnosis Date    Adjustment disorder with mixed anxiety and depressed mood     AVM (arteriovenous malformation)left brain-Surgery 1981-spasticity of right arm and leg,seizure 1982 43/31/4122    Clicking right shoulder 4/6/2015    Dyslipidemia, goal LDL below 100 9/20/2019    History of skin cancer 4/6/2015    Primary osteoarthritis of right shoulder- on 2015 X ray 2016    Seizures ([address]     last seizure         Social History     Socioeconomic History    Marital status:      Spouse name: Not on file    Number of children: Not on file    Years of education: Not on file    Highest education level: Not on file   Occupational History    Not on file   Social Needs    Financial resource strain: Not on file    Food insecurity     Worry: Not on file     Inability: Not on file    Transportation needs     Medical: Not on file     Non-medical: Not on file   Tobacco Use    Smoking status: Former Smoker     Packs/day: 1.50     Years: 10.00     Pack years: 15.00     Types: Cigarettes     Last attempt to quit: 2011     Years since quittin.6    Smokeless tobacco: Former User    Tobacco comment: chart indicated \"heavy smoker\"   Substance and Sexual Activity    Alcohol use: Yes     Comment: rarely    Drug use: No    Sexual activity: Not on file   Lifestyle    Physical activity     Days per week: Not on file     Minutes per session: Not on file    Stress: Not on file   Relationships    Social connections     Talks on phone: Not on file     Gets together: Not on file     Attends Sikhism service: Not on file     Active member of club or organization: Not on file     Attends meetings of clubs or organizations: Not on file     Relationship status: Not on file    Intimate partner violence     Fear of current or ex partner: Not on file     Emotionally abused: Not on file     Physically abused: Not on file     Forced sexual activity: Not on file   Other Topics Concern    Not on file   Social History Narrative    Not on file       Family History   Problem Relation Age of Onset    Hypertension Mother     Stroke Father     Heart Disease Father     Hypertension Father     Cancer Father         colorectal spread to liver     Heart Disease Paternal Aunt         mi    Heart Disease Paternal Uncle mi       Current Outpatient Medications   Medication Sig    venlafaxine (EFFEXOR) 75 mg tablet 75 mg.  busPIRone (BUSPAR) 15 mg tablet TAKE 2 TABLETS BY MOUTH TWO TIMES A DAY    PARoxetine (PAXIL) 40 mg tablet TAKE ONE TABLET BY MOUTH DAILY    clobetasoL (TEMOVATE) 0.05 % topical cream Apply  to affected area.  timolol (TIMOPTIC-XE) 0.5 % ophthalmic gel-forming     carBAMazepine (TEGRETOL) 200 mg tablet 800mg in am and 600mg in pm MON and THUR  600mg bid other days  Indications: Prevention of Seizures following Head Injury or Surgery (Patient taking differently: 600mg in am and 600mg in pm 7 days a week  Indications: prevention of seizures following head injury or surgery)    adapalene (DIFFERIN) 0.3 % topical gel     benzoyl peroxide 5 % external liquid     Sulfacetamide Sodium-Sulfur 10-5 % (w/w) lotion     diclofenac EC (VOLTAREN) 75 mg EC tablet TAKE ONE TABLET BY MOUTH TWICE A DAY  WITH FOOD AS NEEDED FOR SHOULDER PAIN. **GENERIC FOR: VOLTAREN    acetaminophen (TYLENOL EXTRA STRENGTH) 500 mg tablet Take 2 Tabs by mouth every six (6) hours as needed for Pain.  AVAR-E LS 10-2 % crea     doxycycline (VIBRAMYCIN) 100 mg capsule Take 100 mg by mouth daily.  multivit-min-FA-K-lycopene (ONE-A-DAY MEN'S 50+ ADVANTAGE) 400- mcg Tab Take  by mouth. No current facility-administered medications for this visit. Objective:     Vitals:    09/28/20 1448 09/28/20 1527   BP: (!) 150/83 128/70   Pulse: 69    Resp: 16    Temp: 98.9 °F (37.2 °C)    TempSrc: Skin    SpO2: 99%    Weight: 210 lb 6.4 oz (95.4 kg)    Height: 5' 6\" (1.676 m)        Physical Examination:  General: Alert, cooperative, no distress, appears stated age. Obese  Eyes: Conjunctivae clear. Pupils equally round and reactive to light, Extraocular muscles intact. Ears: Normal external ear canals both ears. Nose: Nares normal. Septum midline. Mucosa normal. No drainage or sinus tenderness.   Mouth/Throat: Lips, mucosa, and tongue normal. No oropharyngeal erythema. No tonsillar enlargement or exudate. Neck: Supple, symmetrical, trachea midline, no adenopathy. No thyroid enlargement/tenderness/nodules  Respiratory: Breathing comfortably, in no acute respiratory distress. Clear to auscultation bilaterally. Normal inspiratory and expiratory ratio. Cardiovascular: Regular rate and rhythm, S1, S2 normal, no murmur, click, rub or gallop.   -Extremities no edema. Pulses 2+ and symmetric radial and dorsalis pedis   Abdomen: Soft, non-tender, not distended. Bowel sounds normal. No masses or organomegaly. MSK: Extremities normal appearing, atraumatic, no effusion. Gait steady and unassisted. Skin: Skin color, texture, turgor normal. No rashes or lesions on exposed skin. Lymph nodes: Cervical, supraclavicular nodes normal.  Neurologic: Cranial nerves II-XII intact. Strength 5/5 grossly. Sensation and reflexes normal throughout. Psychiatric: Affect appropriate. Mood euthymic. Thoughts logical. Speech volume and speed normal    No visits with results within 3 Month(s) from this visit.    Latest known visit with results is:   Office Visit on 03/06/2020   Component Date Value Ref Range Status    Cholesterol, total 03/06/2020 182  100 - 199 mg/dL Final    Triglyceride 03/06/2020 110  0 - 149 mg/dL Final    HDL Cholesterol 03/06/2020 55  >39 mg/dL Final    VLDL, calculated 03/06/2020 22  5 - 40 mg/dL Final    LDL, calculated 03/06/2020 105* 0 - 99 mg/dL Final    Glucose 03/06/2020 84  65 - 99 mg/dL Final    BUN 03/06/2020 14  6 - 24 mg/dL Final    Creatinine 03/06/2020 0.81  0.76 - 1.27 mg/dL Final    GFR est non-AA 03/06/2020 99  >59 mL/min/1.73 Final    GFR est AA 03/06/2020 115  >59 mL/min/1.73 Final    BUN/Creatinine ratio 03/06/2020 17  9 - 20 Final    Sodium 03/06/2020 144  134 - 144 mmol/L Final    Potassium 03/06/2020 4.6  3.5 - 5.2 mmol/L Final    Chloride 03/06/2020 104  96 - 106 mmol/L Final    CO2 03/06/2020 26  20 - 29 mmol/L Final    Calcium 03/06/2020 9.6  8.7 - 10.2 mg/dL Final    Protein, total 03/06/2020 7.1  6.0 - 8.5 g/dL Final    Albumin 03/06/2020 4.8  3.8 - 4.9 g/dL Final    GLOBULIN, TOTAL 03/06/2020 2.3  1.5 - 4.5 g/dL Final    A-G Ratio 03/06/2020 2.1  1.2 - 2.2 Final    Bilirubin, total 03/06/2020 0.3  0.0 - 1.2 mg/dL Final    Alk. phosphatase 03/06/2020 66  39 - 117 IU/L Final    AST (SGOT) 03/06/2020 25  0 - 40 IU/L Final    ALT (SGPT) 03/06/2020 23  0 - 44 IU/L Final    WBC 03/06/2020 6.3  3.4 - 10.8 x10E3/uL Final    RBC 03/06/2020 4.07* 4.14 - 5.80 x10E6/uL Final    HGB 03/06/2020 13.3  13.0 - 17.7 g/dL Final    HCT 03/06/2020 37.7  37.5 - 51.0 % Final    MCV 03/06/2020 93  79 - 97 fL Final    MCH 03/06/2020 32.7  26.6 - 33.0 pg Final    MCHC 03/06/2020 35.3  31.5 - 35.7 g/dL Final    RDW 03/06/2020 11.7  11.6 - 15.4 % Final    PLATELET 50/19/8851 237  150 - 450 x10E3/uL Final    T4, Free 03/06/2020 0.95  0.82 - 1.77 ng/dL Final    TSH 03/06/2020 0.675  0.450 - 4.500 uIU/mL Final    VITAMIN D, 25-HYDROXY 03/06/2020 38.9  30.0 - 100.0 ng/mL Final    Comment: Vitamin D deficiency has been defined by the [place] of  Medicine and an Endocrine Society practice guideline as a  level of serum 25-OH vitamin D less than 20 ng/mL (1,2). The Endocrine Society went on to further define vitamin D  insufficiency as a level between 21 and 29 ng/mL (2). 1. IOM ([place] of Medicine). 2010. Dietary reference     intakes for calcium and D. [facility]: The     [place]. 2. Cedrick MF, Ashley NC, Alvin LIGHT, et al.     Evaluation, treatment, and prevention of vitamin D     deficiency: an Endocrine Society clinical practice     guideline. JCEM. 2011 Jul; 96(7):1911-30.  Prostate Specific Ag 03/06/2020 0.7  0.0 - 4.0 ng/mL Final    Comment: Roche ECLIA methodology.   According to the American Urological Association, Serum PSA should  decrease and remain at undetectable levels after radical  prostatectomy. The AUA defines biochemical recurrence as an initial  PSA value 0.2 ng/mL or greater followed by a subsequent confirmatory  PSA value 0.2 ng/mL or greater. Values obtained with different assay methods or kits cannot be used  interchangeably. Results cannot be interpreted as absolute evidence  of the presence or absence of malignant disease.  Reflex Criteria 03/06/2020 Comment   Final    Comment: The percent free PSA is performed on a reflex basis only when the  total PSA is between 4.0 and 10.0 ng/mL.  INTERPRETATION 03/06/2020 Note   Final    Supplemental report is available. Assessment/ Plan:   Diagnoses and all orders for this visit:    1. Hospital discharge follow-up  -     CARBAMAZEPINE  -     CBC WITH AUTOMATED DIFF  -     METABOLIC PANEL, COMPREHENSIVE  -     MAGNESIUM    2. Accidental carbamazepine poisoning, sequela  -     CARBAMAZEPINE    3. Aspiration pneumonia, unspecified aspiration pneumonia type, unspecified laterality, unspecified part of lung ([facility] [address]  -     CBC WITH AUTOMATED DIFF  -     METABOLIC PANEL, COMPREHENSIVE  -     XR BA SWALLOW ESOPHOGRAM; Future    4. Seizures- only one in 1982-due to AVM/ assoc Sx    5. S/P spinal fusion    6. Elevated blood pressure reading      For today's visit, I did the following:  · Reviewed PMH as listed in orders  · Reviewed labs in detail or ordered lab  Labs to eval end organ function and etiology of chronic/acute concerns. Symptoms improved since discharge. Aspiration pneumonia improved after antibiotic treatment. Check Tegretol level to confirm persistent resolution of toxicity. Continue current regimen. Pressure well controlled off blood pressure medicines post discharge. Follow up with specialists per routine. Educated patient on red flag symptoms to warrant return to clinic or emergency room visit.     I have discussed the diagnosis with the patient and the intended plan as seen in the above orders. The patient has been offered or received an after-visit summary and questions were answered concerning future plans. I have discussed medication side effects and warnings with the patient as well. Follow-up and Dispositions    · Return in about 3 months (around 12/28/2020) for Follow Up PCP.            Signed,    Chula Rocha MD  9/28/2020

## 2020-09-28 NOTE — PROGRESS NOTES
Chief Complaint   Patient presents with   [facility] Follow Up     [employer] 9/14- 9/21       1. Have you been to the ER, urgent care clinic since your last visit? Hospitalized since your last visit? Yes   Chief Complaint   Patient presents with   [facility] Follow Up     [employer] 9/14- 9/21           2. Have you seen or consulted any other health care providers outside of the [address] [place] [address] since your last visit? Include any pap smears or colon screening.  No

## 2020-09-28 NOTE — PATIENT INSTRUCTIONS
Aspiration Pneumonia: Care Instructions Your Care Instructions Aspiration pneumonia is an inflammation of the lungs. It may occur after you breathe in large amounts of foreign material, such as food, liquid, vomit, or mucus. Aspiration may happen because of a health problem that makes it hard to swallow. These problems include stroke or seizure. Pneumonia makes it hard to breathe. Follow-up care is a key part of your treatment and safety. Be sure to make and go to all appointments, and call your doctor if you are having problems. It's also a good idea to know your test results and keep a list of the medicines you take. How can you care for yourself at home? To help with swallowing · You may need to do exercises to train your muscles to work together to help you swallow. You may also need to learn how to position your body or how to put food in your mouth to be able to swallow better. · You may need to change the foods you eat. Your doctor may tell you to eat certain foods and liquids to make swallowing easier. · You may need to change how you prepare foods. For example, you may need to add thickeners to some liquids, or puree certain foods in a . To help with pneumonia · Take your antibiotics as directed. Do not stop taking them just because you feel better. You need to take the full course of antibiotics. · Take your medicines exactly as prescribed. For example, your doctor may have given you medicine that makes breathing easier. Call your doctor if you think you are having a problem with your medicine. · Get plenty of rest and sleep. You may feel weak and tired for a while, but your energy level will improve with time. · Take care of your cough so you can rest. A cough that brings up mucus from your lungs is common with pneumonia. It is one way your body gets rid of the infection.  But if coughing keeps you from resting or causes severe fatigue and chest-wall pain, talk to your doctor. He or she may suggest that you take a medicine to reduce the cough. · Use a humidifier to increase the moisture in the air. Dry air makes coughing worse. Follow the instructions for cleaning the machine. · Do not smoke, and avoid others' smoke. Smoke will make your cough last longer. If you need help quitting, talk to your doctor about stop-smoking programs and medicines. These can increase your chances of quitting for good. · Take an over-the-counter pain medicine, such as acetaminophen (Tylenol), ibuprofen (Advil, Motrin), or naproxen (Aleve) to help reduce fever and reduce chest pain caused by coughing. Read and follow all instructions on the label. · Do not take two or more pain medicines at the same time unless the doctor told you to. Many pain medicines have acetaminophen, which is Tylenol. Too much acetaminophen (Tylenol) can be harmful. When should you call for help? Call 911 anytime you think you may need emergency care. For example, call if: 
  · You have severe trouble breathing. Call your doctor now or seek immediate medical care if: 
  · You have a new or higher fever.  
  · You have new or worse trouble breathing.  
  · You cough up blood.  
  · You are dizzy or lightheaded, or you feel like you may faint. Watch closely for changes in your health, and be sure to contact your doctor if: 
  · You do not get better as expected.  
  · You are coughing more deeply or more often. Where can you learn more? Go to http://www.gray.com/ Enter 41621 [id number] in the search box to learn more about \"Aspiration Pneumonia: Care Instructions. \" Current as of: February 24, 2020               Content Version: 12.6 © 5112-1387 [employer]. Care instructions adapted under license by [email or web address] (which disclaims liability or warranty for this information).  If you have questions about a medical condition or this instruction, always ask your healthcare professional. [address] any warranty or liability for your use of this information. Learning About the Diet for Swallowing Problems What are swallowing problems? Difficulty swallowing is also called dysphagia (say \"jxu-VPA-tss-uh\"). It is most often a sign of a problem with your throat or esophagus. This is the tube that moves food and liquids from the back of your mouth to your stomach. Trouble swallowing can occur when the muscles and nerves that move food through the throat and esophagus are not working right. To help you swallow food, your doctor or speech therapist may advise a special dysphagia diet for you. Why is a special diet important? A dysphagia diet can help you handle some problems that can occur when it's hard to swallow food and liquids easily. These problems can include: · Malnutrition. This means you aren't getting enough healthy foods to keep your body working well. · Dehydration. This means you aren't getting enough liquids to keep your body healthy. · Aspiration. This means that food, liquid, or saliva goes down your windpipe (trachea) into your lungs, instead of down your esophagus to your stomach. This can lead to aspiration pneumonia, which is an inflammation of the lungs. What is the dysphagia diet? In the dysphagia diet, you change the foods you eat and the liquids you drink to make it easier to swallow them. You may: · Change the texture of the foods you eat. Your doctor or speech therapist may advise you to eat one of these types of foods: 
? Easy-to-chew foods. These are foods that are soft or tender. ? Soft and bite-sized foods. These are soft foods that have been cut into small pieces. ? Minced and moist foods. These are very soft, small, and moist lumps of food that need very little chewing. ? Pureed foods. These are foods that have been blended smooth. The puree must be thick enough to hold its shape on a spoon. These foods don't need to be chewed. ? Liquidized foods. These are foods that have been blended smooth but are not as thick as pureed foods. You can drink them from a cup. · Thicken the liquids you drink. Your doctor or speech therapist will tell you what kind of thickener to use and how thick to make the liquids. ? Slightly thick liquids. These are thicker than water but can flow through a straw. ? Mildly thick liquids. These can be sipped from a cup but take some effort to drink with a straw. ? Moderately thick liquids. These liquids are thick enough to drink from a cup or from a spoon. But they are hard to drink through a wide straw. ? Extremely thick liquids. These are thick enough to hold their shape on a spoon. They are too thick to drink from a cup or suck through a straw. Your speech therapist will help you learn exercises to train your muscles to work together so you can swallow. You may also need to learn how to position your body or how to put food in your mouth to be able to swallow better. Follow-up care is a key part of your treatment and safety. Be sure to make and go to all appointments, and call your doctor if you are having problems. It's also a good idea to know your test results and keep a list of the medicines you take. Where can you learn more? Go to http://www.gray.com/ Enter P614 in the search box to learn more about \"Learning About the Diet for Swallowing Problems. \" Current as of: June 26, 2019               Content Version: 12.6 © 7780-4685 [employer]. Care instructions adapted under license by [employer] (which disclaims liability or warranty for this information).  If you have questions about a medical condition or this instruction, always ask your healthcare professional. [address] any warranty or liability for your use of this information. DASH Diet: Care Instructions Your Care Instructions The DASH diet is an eating plan that can help lower your blood pressure. DASH stands for Dietary Approaches to Stop Hypertension. Hypertension is high blood pressure. The DASH diet focuses on eating foods that are high in calcium, potassium, and magnesium. These nutrients can lower blood pressure. The foods that are highest in these nutrients are fruits, vegetables, low-fat dairy products, nuts, seeds, and legumes. But taking calcium, potassium, and magnesium supplements instead of eating foods that are high in those nutrients does not have the same effect. The DASH diet also includes whole grains, fish, and poultry. The DASH diet is one of several lifestyle changes your doctor may recommend to lower your high blood pressure. Your doctor may also want you to decrease the amount of sodium in your diet. Lowering sodium while following the DASH diet can lower blood pressure even further than just the DASH diet alone. Follow-up care is a key part of your treatment and safety. Be sure to make and go to all appointments, and call your doctor if you are having problems. It's also a good idea to know your test results and keep a list of the medicines you take. How can you care for yourself at home? Following the DASH diet · Eat 4 to 5 servings of fruit each day. A serving is 1 medium-sized piece of fruit, ½ cup chopped or canned fruit, 1/4 cup dried fruit, or 4 ounces (½ cup) of fruit juice. Choose fruit more often than fruit juice. · Eat 4 to 5 servings of vegetables each day. A serving is 1 cup of lettuce or raw leafy vegetables, ½ cup of chopped or cooked vegetables, or 4 ounces (½ cup) of vegetable juice. Choose vegetables more often than vegetable juice. · Get 2 to 3 servings of low-fat and fat-free dairy each day.  A serving is 8 ounces of milk, 1 cup of yogurt, or 1 ½ ounces of cheese. · Eat 6 to 8 servings of grains each day. A serving is 1 slice of bread, 1 ounce of dry cereal, or ½ cup of cooked rice, pasta, or cooked cereal. Try to choose whole-grain products as much as possible. · Limit lean meat, poultry, and fish to 2 servings each day. A serving is 3 ounces, about the size of a deck of cards. · Eat 4 to 5 servings of nuts, seeds, and legumes (cooked dried beans, lentils, and split peas) each week. A serving is 1/3 cup of nuts, 2 tablespoons of seeds, or ½ cup of cooked beans or peas. · Limit fats and oils to 2 to 3 servings each day. A serving is 1 teaspoon of vegetable oil or 2 tablespoons of salad dressing. · Limit sweets and added sugars to 5 servings or less a week. A serving is 1 tablespoon jelly or jam, ½ cup sorbet, or 1 cup of lemonade. · Eat less than 2,300 milligrams (mg) of sodium a day. If you limit your sodium to 1,500 mg a day, you can lower your blood pressure even more. Tips for success · Start small. Do not try to make dramatic changes to your diet all at once. You might feel that you are missing out on your favorite foods and then be more likely to not follow the plan. Make small changes, and stick with them. Once those changes become habit, add a few more changes. · Try some of the following: ? Make it a goal to eat a fruit or vegetable at every meal and at snacks. This will make it easy to get the recommended amount of fruits and vegetables each day. ? Try yogurt topped with fruit and nuts for a snack or healthy dessert. ? Add lettuce, tomato, cucumber, and onion to sandwiches. ? Combine a ready-made pizza crust with low-fat mozzarella cheese and lots of vegetable toppings. Try using tomatoes, squash, spinach, broccoli, carrots, cauliflower, and onions. ? Have a variety of cut-up vegetables with a low-fat dip as an appetizer instead of chips and dip. ? Sprinkle sunflower seeds or chopped almonds over salads. Or try adding chopped walnuts or almonds to cooked vegetables. ? Try some vegetarian meals using beans and peas. Add garbanzo or kidney beans to salads. Make burritos and tacos with mashed perales beans or black beans. Where can you learn more? Go to http://www.gray.com/ Enter Z087 in the search box to learn more about \"DASH Diet: Care Instructions. \" Current as of: December 16, 2019               Content Version: 12.6 © 7786-7767 [employer]. Care instructions adapted under license by [employer] (which disclaims liability or warranty for this information). If you have questions about a medical condition or this instruction, always ask your healthcare professional. [address] any warranty or liability for your use of this information.

## 2020-09-29 LAB
ALBUMIN SERPL-MCNC: 4.5 G/DL (ref 3.8–4.9)
ALBUMIN/GLOB SERPL: 1.6 {RATIO} (ref 1.2–2.2)
ALP SERPL-CCNC: 93 IU/L (ref 39–117)
ALT SERPL-CCNC: 27 IU/L (ref 0–44)
AST SERPL-CCNC: 22 IU/L (ref 0–40)
BASOPHILS # BLD AUTO: 0.1 X10E3/UL (ref 0–0.2)
BASOPHILS NFR BLD AUTO: 1 %
BILIRUB SERPL-MCNC: <0.2 MG/DL (ref 0–1.2)
BUN SERPL-MCNC: 7 MG/DL (ref 6–24)
BUN/CREAT SERPL: 9 (ref 9–20)
CALCIUM SERPL-MCNC: 9.7 MG/DL (ref 8.7–10.2)
CARBAMAZEPINE SERPL-MCNC: 9.5 UG/ML (ref 4–12)
CHLORIDE SERPL-SCNC: 100 MMOL/L (ref 96–106)
CO2 SERPL-SCNC: 30 MMOL/L (ref 20–29)
CREAT SERPL-MCNC: 0.74 MG/DL (ref 0.76–1.27)
EOSINOPHIL # BLD AUTO: 0.1 X10E3/UL (ref 0–0.4)
EOSINOPHIL NFR BLD AUTO: 2 %
ERYTHROCYTE [DISTWIDTH] IN BLOOD BY AUTOMATED COUNT: 11.6 % (ref 11.6–15.4)
GLOBULIN SER CALC-MCNC: 2.8 G/DL (ref 1.5–4.5)
GLUCOSE SERPL-MCNC: 85 MG/DL (ref 65–99)
HCT VFR BLD AUTO: 36 % (ref 37.5–51)
HGB BLD-MCNC: 12.2 G/DL (ref 13–17.7)
IMM GRANULOCYTES # BLD AUTO: 0 X10E3/UL (ref 0–0.1)
IMM GRANULOCYTES NFR BLD AUTO: 1 %
LYMPHOCYTES # BLD AUTO: 2 X10E3/UL (ref 0.7–3.1)
LYMPHOCYTES NFR BLD AUTO: 30 %
MAGNESIUM SERPL-MCNC: 2.3 MG/DL (ref 1.6–2.3)
MCH RBC QN AUTO: 31.7 PG (ref 26.6–33)
MCHC RBC AUTO-ENTMCNC: 33.9 G/DL (ref 31.5–35.7)
MCV RBC AUTO: 94 FL (ref 79–97)
MONOCYTES # BLD AUTO: 0.9 X10E3/UL (ref 0.1–0.9)
MONOCYTES NFR BLD AUTO: 14 %
NEUTROPHILS # BLD AUTO: 3.3 X10E3/UL (ref 1.4–7)
NEUTROPHILS NFR BLD AUTO: 52 %
PLATELET # BLD AUTO: 619 X10E3/UL (ref 150–450)
POTASSIUM SERPL-SCNC: 4.7 MMOL/L (ref 3.5–5.2)
PROT SERPL-MCNC: 7.3 G/DL (ref 6–8.5)
RBC # BLD AUTO: 3.85 X10E6/UL (ref 4.14–5.8)
SODIUM SERPL-SCNC: 141 MMOL/L (ref 134–144)
WBC # BLD AUTO: 6.4 X10E3/UL (ref 3.4–10.8)

## 2020-10-02 NOTE — PROGRESS NOTES
Your carbamazepine level is in the therapeutic range and no longer int he toxic range. Your have mild anemia, likely as an effect of your recent hospitalization. Discuss repeat of this testing with you PCP in the next 3 months.  Mychart result comment sent

## 2020-10-06 ENCOUNTER — OFFICE VISIT (OUTPATIENT)
Dept: FAMILY MEDICINE CLINIC | Age: 55
End: 2020-10-06
Payer: COMMERCIAL

## 2020-10-06 VITALS
HEART RATE: 70 BPM | DIASTOLIC BLOOD PRESSURE: 70 MMHG | OXYGEN SATURATION: 98 % | SYSTOLIC BLOOD PRESSURE: 140 MMHG | WEIGHT: 211.8 LBS | BODY MASS INDEX: 34.04 KG/M2 | RESPIRATION RATE: 18 BRPM | TEMPERATURE: 99.2 F | HEIGHT: 66 IN

## 2020-10-06 DIAGNOSIS — Q28.2 AVM (ARTERIOVENOUS MALFORMATION) BRAIN: Chronic | ICD-10-CM

## 2020-10-06 DIAGNOSIS — M19.011 PRIMARY OSTEOARTHRITIS OF RIGHT SHOULDER: Chronic | ICD-10-CM

## 2020-10-06 DIAGNOSIS — F43.23 ADJUSTMENT DISORDER WITH MIXED ANXIETY AND DEPRESSED MOOD: ICD-10-CM

## 2020-10-06 DIAGNOSIS — M21.371 ACQUIRED RIGHT FOOT DROP: ICD-10-CM

## 2020-10-06 DIAGNOSIS — R56.9 SEIZURES (HCC): Primary | ICD-10-CM

## 2020-10-06 DIAGNOSIS — E78.5 DYSLIPIDEMIA, GOAL LDL BELOW 100: ICD-10-CM

## 2020-10-06 DIAGNOSIS — Z98.1 S/P SPINAL FUSION: ICD-10-CM

## 2020-10-06 DIAGNOSIS — E55.9 VITAMIN D DEFICIENCY: ICD-10-CM

## 2020-10-06 PROCEDURE — 99214 OFFICE O/P EST MOD 30 MIN: CPT | Performed by: FAMILY MEDICINE

## 2020-10-06 RX ORDER — OXYCODONE HYDROCHLORIDE 5 MG/1
1-2 TABLET ORAL
COMMUNITY
Start: 2020-09-17 | End: 2021-11-11 | Stop reason: ALTCHOICE

## 2020-10-06 RX ORDER — METHOCARBAMOL 750 MG/1
750 TABLET, FILM COATED ORAL
COMMUNITY
Start: 2020-09-18 | End: 2020-10-08

## 2020-10-06 NOTE — PROGRESS NOTES
Chief Complaint   Patient presents with    Follow-up     1. Have you been to the ER, urgent care clinic since your last visit? Hospitalized since your last visit? Yes When: see encounters    2. Have you seen or consulted any other health care providers outside of the [address] [place] [address] since your last visit? Include any pap smears or colon screening.  No

## 2020-10-06 NOTE — PROGRESS NOTES
HISTORY OF PRESENT ILLNESS  HPI  Zuhair Bunch is a 62 y.o. Male with a history of AVM (1981), adjustment disorder with anxiety and depression, seizures, convulsions, osteoarthritis of right shoulder and skin cancer, who presents to the office today for a follow up on his tegretol    Pt recalls he had lab work done last week. Chikis Franco He says he went to [address] [place] [address] for Sx on 09/14-09/15 for an unstable neck( no injury) and was discharged on 09/21. He denies any injury to cause this. . He admits he has pain in his shoulder and neck, more so on the right than the left. Pt adds discomfort radiated to his R elbow. Pt states his lower extremities were not affected. He notes the he is not doing therapy yet. He remarks he can walk with walker, using it for balance. Pt says he got pneumonia on right side after Sx, but he is taking antibiotics with relief. He denies hx of pneumonia prior to Sx. Pt has a hx of AV malformation Sx yrs ago affected right side( hypertoniicty of that side), with his upper extremities worse than the lower. Pt feels his hand has less feeling since his Sx. He recalls his neck pain began in the 1980's, but with the emergence and worsening of new symptoms in the past year he began to see specialists for this. He estimates he has been having falls for about a year, having a fall every few months( felt to be from compression on his cervical spinal cord)    Pt denies unusual SOB, chest pain, and any other ER visits or hospitalizations since 09/21/2020.          Past Medical History:   Diagnosis Date    Adjustment disorder with mixed anxiety and depressed mood     AVM (arteriovenous malformation)left brain-Surgery 1981-spasticity of right arm and leg,seizure 1982 7070/25/3313    Clicking right shoulder 4/6/2015    Dyslipidemia, goal LDL below 100 9/20/2019    History of skin cancer 4/6/2015    Primary osteoarthritis of right shoulder- on 12/2015 X ray 1/9/2016    Seizures ([facility] [address]     last seizure 1982 Past Surgical History:   Procedure Laterality Date    HX HEENT      wisdom teeth    HX TONSIL AND ADENOIDECTOMY      HX TONSILLECTOMY      NEUROLOGICAL PROCEDURE UNLISTED  22 yo   1981    L side brain surgery/ arteriovenous malformation     Current Outpatient Medications on File Prior to Visit   Medication Sig Dispense Refill    oxyCODONE IR (ROXICODONE) 5 mg immediate release tablet 1-2 Tabs.  methocarbamoL (ROBAXIN) 750 mg tablet Take 750 mg by mouth.  venlafaxine (EFFEXOR) 75 mg tablet 75 mg.  busPIRone (BUSPAR) 15 mg tablet TAKE 2 TABLETS BY MOUTH TWO TIMES A  Tab 2    PARoxetine (PAXIL) 40 mg tablet TAKE ONE TABLET BY MOUTH DAILY 90 Tab 1    clobetasoL (TEMOVATE) 0.05 % topical cream Apply  to affected area.  timolol (TIMOPTIC-XE) 0.5 % ophthalmic gel-forming       carBAMazepine (TEGRETOL) 200 mg tablet 800mg in am and 600mg in pm MON and THUR  600mg bid other days  Indications: Prevention of Seizures following Head Injury or Surgery (Patient taking differently: 600mg in am and 600mg in pm 7 days a week  Indications: prevention of seizures following head injury or surgery) 210 Tab 11    adapalene (DIFFERIN) 0.3 % topical gel       benzoyl peroxide 5 % external liquid       Sulfacetamide Sodium-Sulfur 10-5 % (w/w) lotion       diclofenac EC (VOLTAREN) 75 mg EC tablet TAKE ONE TABLET BY MOUTH TWICE A DAY  WITH FOOD AS NEEDED FOR SHOULDER PAIN. **GENERIC FOR: VOLTAREN 60 Tab 0    acetaminophen (TYLENOL EXTRA STRENGTH) 500 mg tablet Take 2 Tabs by mouth every six (6) hours as needed for Pain.  AVAR-E LS 10-2 % crea       doxycycline (VIBRAMYCIN) 100 mg capsule Take 100 mg by mouth daily.  multivit-min-FA-K-lycopene (ONE-A-DAY MEN'S 50+ ADVANTAGE) 400- mcg Tab Take  by mouth. No current facility-administered medications on file prior to visit.       Allergies   Allergen Reactions    Adhesive Tape-Silicones Rash     Family History   Problem Relation Age of Onset    Hypertension Mother     Stroke Father     Heart Disease Father     Hypertension Father     Cancer Father         colorectal spread to liver     Heart Disease Paternal Aunt         mi    Heart Disease Paternal Uncle         mi     Social History     Socioeconomic History    Marital status:      Spouse name: Not on file    Number of children: Not on file    Years of education: Not on file    Highest education level: Not on file   Tobacco Use    Smoking status: Former Smoker     Packs/day: 1.50     Years: 10.00     Pack years: 15.00     Types: Cigarettes     Last attempt to quit: 2011     Years since quittin.6    Smokeless tobacco: Former User    Tobacco comment: chart indicated \"heavy smoker\"   Substance and Sexual Activity    Alcohol use: Yes     Comment: rarely    Drug use: No           Review of Systems   Constitutional: Negative for chills, diaphoresis, fever, malaise/fatigue and weight loss. Eyes: Negative for blurred vision, double vision, pain and redness. Respiratory: Negative for cough, shortness of breath and wheezing. Cardiovascular: Negative for chest pain, palpitations, orthopnea, claudication, leg swelling and PND. Skin: Negative for itching and rash. Neurological: Negative for dizziness, tingling, tremors, sensory change, speech change, focal weakness, seizures, loss of consciousness, weakness and headaches.      Results for orders placed or performed in visit on 20   CARBAMAZEPINE   Result Value Ref Range    Carbamazepine 9.5 4.0 - 12.0 ug/mL   CBC WITH AUTOMATED DIFF   Result Value Ref Range    WBC 6.4 3.4 - 10.8 x10E3/uL    RBC 3.85 (L) 4.14 - 5.80 x10E6/uL    HGB 12.2 (L) 13.0 - 17.7 g/dL    HCT 36.0 (L) 37.5 - 51.0 %    MCV 94 79 - 97 fL    MCH 31.7 26.6 - 33.0 pg    MCHC 33.9 31.5 - 35.7 g/dL    RDW 11.6 11.6 - 15.4 %    PLATELET 590 (H) 906 - 450 x10E3/uL    NEUTROPHILS 52 Not Estab. %    Lymphocytes 30 Not Estab. %    MONOCYTES 14 Not Estab. %    EOSINOPHILS 2 Not Estab. %    BASOPHILS 1 Not Estab. %    ABS. NEUTROPHILS 3.3 1.4 - 7.0 x10E3/uL    Abs Lymphocytes 2.0 0.7 - 3.1 x10E3/uL    ABS. MONOCYTES 0.9 0.1 - 0.9 x10E3/uL    ABS. EOSINOPHILS 0.1 0.0 - 0.4 x10E3/uL    ABS. BASOPHILS 0.1 0.0 - 0.2 x10E3/uL    IMMATURE GRANULOCYTES 1 Not Estab. %    ABS. IMM. GRANS. 0.0 0.0 - 0.1 M93D8/TA   METABOLIC PANEL, COMPREHENSIVE   Result Value Ref Range    Glucose 85 65 - 99 mg/dL    BUN 7 6 - 24 mg/dL    Creatinine 0.74 (L) 0.76 - 1.27 mg/dL    GFR est non- >59 mL/min/1.73    GFR est  >59 mL/min/1.73    BUN/Creatinine ratio 9 9 - 20    Sodium 141 134 - 144 mmol/L    Potassium 4.7 3.5 - 5.2 mmol/L    Chloride 100 96 - 106 mmol/L    CO2 30 (H) 20 - 29 mmol/L    Calcium 9.7 8.7 - 10.2 mg/dL    Protein, total 7.3 6.0 - 8.5 g/dL    Albumin 4.5 3.8 - 4.9 g/dL    GLOBULIN, TOTAL 2.8 1.5 - 4.5 g/dL    A-G Ratio 1.6 1.2 - 2.2    Bilirubin, total <0.2 0.0 - 1.2 mg/dL    Alk. phosphatase 93 39 - 117 IU/L    AST (SGOT) 22 0 - 40 IU/L    ALT (SGPT) 27 0 - 44 IU/L   MAGNESIUM   Result Value Ref Range    Magnesium 2.3 1.6 - 2.3 mg/dL             Physical Exam  Visit Vitals  BP (!) 140/70 (BP 1 Location: Right arm, BP Patient Position: Sitting)   Pulse 70   Temp 99.2 °F (37.3 °C) (Temporal)   Resp 18   Ht 5' 6\" (1.676 m)   Wt 211 lb 12.8 oz (96.1 kg)   SpO2 98%   BMI 34.19 kg/m²       Head: Normocephalic, without obvious abnormality, atraumatic  Eyes: conjunctivae/corneas clear. PERRL, EOM's intact.    Neck: supple, symmetrical, trachea midline, no adenopathy, thyroid: not enlarged, symmetric, no tenderness/mass/nodules, no carotid bruit and no JVD  Lungs: clear to auscultation bilaterally  Heart: regular rate and rhythm, S1, S2 normal, no murmur, click, rub or gallop  Extremities: extremities normal, atraumatic, no cyanosis or edema  Pulses: 2+ and symmetric  Lymph nodes: Cervical, supraclavicular, and axillary nodes normal.  Neurologic: He continues with his spasticity in his right arm and leg. He was able to stand up and walk down the hallway with the rolling walker at a fairly normal speed. He did have troubles with the right foot clearing the floor when he stepped forward. The tip of the shoe would catch slightly with each step. ASSESSMENT and PLAN    ICD-10-CM ICD-9-CM    1. Seizures- only one in 1982-due to AVM/ assoc Sx  R56.9 780.39    2. Vitamin D deficiency  E55.9 268.9    3. S/P spinal fusion  Z98.1 V45.4    4. AVM (arteriovenous malformation)left brain-Surgery 1981-spasticity of right arm and leg,seizure in 1982  Q28.2 747.81    5. Adjustment disorder with mixed anxiety and depressed mood  F43.23 309.28    6. Primary osteoarthritis of right shoulder- on 12/2015 X ray  M19.011 715.11    7. Dyslipidemia, goal LDL below 100  E78.5 272.4      Diagnoses and all orders for this visit:    1. Seizures- only one in 1982-due to AVM/ assoc Sx    2. Vitamin D deficiency    3. S/P spinal fusion    4. AVM (arteriovenous malformation)left brain-Surgery 1981-spasticity of right arm and leg,seizure in 1982    5. Adjustment disorder with mixed anxiety and depressed mood    6. Primary osteoarthritis of right shoulder- on 12/2015 X ray    7. Dyslipidemia, goal LDL below 100      Follow-up and Dispositions    · Return in about 6 months (around 4/6/2021) for F/U tegretol use, seizure disorder. .           lab results and schedule of future lab studies reviewed with patient  reviewed diet, exercise and weight control  cardiovascular risk and specific lipid/LDL goals reviewed  reviewed medications and side effects in detail  Please call my office if there are any questions- [phone]. I will arrange for follow up after the lab tests done from today return  Recommended a weekly \"heart check. \" I went into detail how to do this. Call for refills on medications as needed. Discussed expected course/resolution/complications of diagnosis in detail with patient. Medication risks/benefits/costs/interactions/alternatives discussed with patient. Pt was given an after visit summary which includes diagnoses, current medications & vitals. Pt expressed understanding with the diagnosis and plan      Total 25 minutes,60 % counseling re:  Regular exercise is very important to your health; it helps mentally, physically, socially; it prevents injuries if done properly. Exercise, even as simple as walking 20-30 minutes daily has major benefits to your health even though your \"numbers\" are the same in the lab. See if you can add this into your daily regimen and after a few months it will become a regular habit-\"just something you do,\" like brushing your teeth. A combination of aerobic exercise and strengthening and stretching is felt to be the best for you, so this should be your ultimate goal.   This can be done in the privacy of your home or in a group setting as at the gym  Some prefer having a , others prefer to do exercise in groups or individually. Do what \"works\" for you. You need to make it simple and \"fun,\" or you most likely will not continue it. Recommended a weekly \"heart check. \" I went into detail how to do this. Also, discussed symptoms of concern that were noted today in the note above, treatment options( including doing nothing), when to follow up before recommended time frame. Also, answered all questions. Lab work done last week was normal except for some slight anemia, probably due to his recent neck Sx. I encouraged him to continue with his PT and stressed the importance of avoiding further injuries to his neck with his recent Sx. He should stay away from uneven surfaces and walking without some type of support ( PT will direct in what is the safest form of support). An exercise bike would be a good option for his rehab and general conditioning, sandrita if he continues to feel unstable.       This document was written by Cornelia Romero [address] [facility] [address], as dictated by Migel Qiu MD.  I have reviewed and agree with the above note and have made corrections where appropriate Janusz Jc M.D.

## 2020-10-07 PROBLEM — M21.371 ACQUIRED RIGHT FOOT DROP: Status: ACTIVE | Noted: 2020-10-07

## 2020-10-09 ENCOUNTER — HOSPITAL ENCOUNTER (OUTPATIENT)
Dept: GENERAL RADIOLOGY | Age: 55
Discharge: HOME | End: 2020-10-09
Payer: COMMERCIAL

## 2020-10-09 DIAGNOSIS — J69.0 ASPIRATION PNEUMONIA, UNSPECIFIED ASPIRATION PNEUMONIA TYPE, UNSPECIFIED LATERALITY, UNSPECIFIED PART OF LUNG (HCC): ICD-10-CM

## 2020-10-09 PROCEDURE — 92611 MOTION FLUOROSCOPY/SWALLOW: CPT

## 2020-10-09 PROCEDURE — 74230 X-RAY XM SWLNG FUNCJ C+: CPT

## 2020-10-09 NOTE — PROGRESS NOTES
[address], [address] [facility] STUDY  Patient: Mikaela Barnett (22 y.o. male)  Date: 10/9/2020  Referring Provider:  Asia Cortes:   Patient and family report that he had complex ACDF 9/15/20 with subsequent aspiration with aspiration PNA nad ventilation 2x. He was discharged on purees, nectars and told to repeat MBS in 3 weeks. He was provided with swallowing exercises. He also had h/o AVM with surgery and R weakness. OBJECTIVE:   Past Medical History:   Past Medical History:   Diagnosis Date    Adjustment disorder with mixed anxiety and depressed mood     AVM (arteriovenous malformation)left brain-Surgery 1981-spasticity of right arm and leg,seizure 1982 16/95/7550    Clicking right shoulder 4/6/2015    Dyslipidemia, goal LDL below 100 9/20/2019    History of skin cancer 4/6/2015    Primary osteoarthritis of right shoulder- on 12/2015 X ray 1/9/2016    Seizures ([address]     last seizure 1982     Past Surgical History:   Procedure Laterality Date    HX HEENT      wisdom teeth    HX TONSIL AND ADENOIDECTOMY      HX TONSILLECTOMY      NEUROLOGICAL PROCEDURE UNLISTED  22 yo   1981    L side brain surgery/ arteriovenous malformation     Current Dietary Status:  Purees, nectars  Radiologist: Susan Cronin Views: Lateral  Patient Position: upright in Hausted chair    Trial 1: Trial 2:   Consistency Presented: Thin liquid; Nectar thick liquid;Puree; Solid;Pill/Tablet     How Presented: Self-fed/presented;SLP-fed/presented;Spoon;Straw;Cup/gulp      ORAL PHASE:      Bolus Acceptance: Impaired(needed occasional help with feeding)     Bolus Formation/Control: No impairment:    :     Propulsion: No impairment     Oral Residue: None   PHARYNGEAL PHASE:      Initiation of Swallow:   With thins: Triggered at vallecula(especially when mixing food and drinks)     Timing: Pooling 1-5 sec Penetration: (undercoating of epiglottis 2x  when trying to use thins to clear solids residue from pharynx)     Aspiration/Timing: No evidence of aspiration     Pharyngeal Clearance: (with thins, and nectars: minimal in valleculae; with purees: mild-moderate at times in valleculae. solids:  moderate in valleculae and pyriforms due to poor epiglottal inversion. he attempted sustained laryngeal elevation with multiple swallows. This worked with this, but not for solids. Attempted Modifications: Alternate liquids/solids(took pudding to clear solids)     Effective Modifications: (took pudding to clear solids.)                   Trial 3: Trial 4:                            :    :                                                                        Decreased Tongue Base Retraction?: Yes  Laryngeal Elevation: Reduced excursion with laryngeal vestibule gap; Inadequate epiglottic inversion  Aspiration/Penetration Score: 2 (Penetration/No residue-Contrast enters the airway penetrates, remains above the folds/cords, and is cleared)                     ASSESSMENT :  Based on the objective data described above, the patient presents with improvement in swallowing. Current swallow function includes mild delay and mild-moderate weakness with some pharyngeal residue that is worst with solids. He is ready for careful diet upgrade. Derril Hull PLAN/RECOMMENDATIONS :  Ok to upgrade diet to thin liquids. Start lumpy purees. Advance slowly as tolerated. Keep all foods minced and moist.   Use purees to clear solids from throat by alternating. Ok for pills whole: 1 at a time. Careful with meats and breads. Continue swallowing exercises. COMMUNICATION/EDUCATION:   The above findings and recommendations were discussed with: patient and famly who verbalized understanding.     Thank you for this referral.  Vincenzo Reese SLP  Time Calculation: 25 mins

## 2020-10-19 NOTE — PROGRESS NOTES
Your swallowing is improved with no aspirations. Continue diet per speech specialist recommendation.  Mychart result comment sent

## 2020-11-05 RX ORDER — VENLAFAXINE HYDROCHLORIDE 75 MG/1
CAPSULE, EXTENDED RELEASE ORAL
Qty: 90 CAP | Refills: 1 | Status: SHIPPED | OUTPATIENT
Start: 2020-11-05 | End: 2021-04-12

## 2021-04-12 RX ORDER — VENLAFAXINE HYDROCHLORIDE 75 MG/1
CAPSULE, EXTENDED RELEASE ORAL
Qty: 90 CAP | Refills: 1 | Status: SHIPPED | OUTPATIENT
Start: 2021-04-12 | End: 2021-09-26

## 2021-05-21 RX ORDER — PAROXETINE HYDROCHLORIDE 40 MG/1
TABLET, FILM COATED ORAL
Qty: 90 TABLET | Refills: 0 | Status: SHIPPED | OUTPATIENT
Start: 2021-05-21 | End: 2021-08-13

## 2021-08-04 RX ORDER — BUSPIRONE HYDROCHLORIDE 15 MG/1
TABLET ORAL
Qty: 360 TABLET | Refills: 1 | Status: SHIPPED | OUTPATIENT
Start: 2021-08-04 | End: 2022-02-13

## 2021-08-13 RX ORDER — PAROXETINE HYDROCHLORIDE 40 MG/1
TABLET, FILM COATED ORAL
Qty: 90 TABLET | Refills: 0 | Status: SHIPPED | OUTPATIENT
Start: 2021-08-13 | End: 2021-11-11

## 2021-09-26 RX ORDER — VENLAFAXINE HYDROCHLORIDE 75 MG/1
CAPSULE, EXTENDED RELEASE ORAL
Qty: 90 CAPSULE | Refills: 0 | Status: SHIPPED | OUTPATIENT
Start: 2021-09-26 | End: 2021-12-26

## 2021-11-08 ENCOUNTER — OFFICE VISIT (OUTPATIENT)
Dept: FAMILY MEDICINE CLINIC | Age: 56
End: 2021-11-08
Payer: COMMERCIAL

## 2021-11-08 DIAGNOSIS — R56.9 SEIZURES (HCC): ICD-10-CM

## 2021-11-08 PROCEDURE — 99214 OFFICE O/P EST MOD 30 MIN: CPT | Performed by: FAMILY MEDICINE

## 2021-11-08 NOTE — PROGRESS NOTES
HISTORY OF PRESENT ILLNESS  HPI  Estefania Ho is a 60 y.o. Male with a history of AVM (), adjustment disorder with anxiety and depression, seizures, convulsions, osteoarthritis of right shoulder and skin cancer, who presents to the office today for follow up of these health problems     Pt's dad  at 68 y.o. His dad had hx of CHF but no hx of bypass or stent. Pt stopped smoking at 46 y.o. He does not recall what he weighed when he got  but he believes he weighed less. He comes in to follow up on his Buspar and Paxil. He takes the Buspar 15 mg BID and takes the Paxil 40 mg once a day. Pt exercises infrequently. Pt reports his right ear has seemed different for the past month. He has noticed this when he puts an earbud in his left ear to listen to audio books, etc. He uses a hearing aid in her right ear. Pt has no hx of ear Sx or ear tubes in the right ear. Pt states his hearing in his left eat is normal. He denies any drainage of the right ear. Pt denies unusual SOB, chest pain, and any recent ER visits or hospitalizations.        Past Medical History:   Diagnosis Date    Adjustment disorder with mixed anxiety and depressed mood     AVM (arteriovenous malformation)left brain-Surgery -spasticity of right arm and leg,seizure 1982    Clicking right shoulder 2015    Dyslipidemia, goal LDL below 100 2019    History of skin cancer 2015    Primary osteoarthritis of right shoulder- on 2015 X ray 2016    Seizures ([address]     last seizure      Past Surgical History:   Procedure Laterality Date    HX HEENT      wisdom teeth    HX TONSIL AND ADENOIDECTOMY      HX TONSILLECTOMY      NEUROLOGICAL PROCEDURE UNLISTED  24 yo       L side brain surgery/ arteriovenous malformation     Current Outpatient Medications on File Prior to Visit   Medication Sig Dispense Refill    venlafaxine-SR (EFFEXOR-XR) 75 mg capsule TAKE ONE CAPSULE BY MOUTH DAILY 90 Capsule 0    [DISCONTINUED] PARoxetine (PAXIL) 40 mg tablet TAKE ONE TABLET BY MOUTH DAILY 90 Tablet 0    busPIRone (BUSPAR) 15 mg tablet TAKE TWO TABLETS BY MOUTH TWICE A  Tablet 1    clobetasoL (TEMOVATE) 0.05 % topical cream Apply  to affected area.  timolol (TIMOPTIC-XE) 0.5 % ophthalmic gel-forming       adapalene (DIFFERIN) 0.3 % topical gel       benzoyl peroxide 5 % external liquid       Sulfacetamide Sodium-Sulfur 10-5 % (w/w) lotion       diclofenac EC (VOLTAREN) 75 mg EC tablet TAKE ONE TABLET BY MOUTH TWICE A DAY  WITH FOOD AS NEEDED FOR SHOULDER PAIN. **GENERIC FOR: VOLTAREN 60 Tab 0    acetaminophen (TYLENOL EXTRA STRENGTH) 500 mg tablet Take 2 Tabs by mouth every six (6) hours as needed for Pain.  AVAR-E LS 10-2 % crea       doxycycline (VIBRAMYCIN) 100 mg capsule Take 100 mg by mouth daily.  multivit-min-FA-K-lycopene (ONE-A-DAY MEN'S 50+ ADVANTAGE) 400- mcg Tab Take  by mouth.  [DISCONTINUED] oxyCODONE IR (ROXICODONE) 5 mg immediate release tablet 1-2 Tabs. (Patient not taking: Reported on 11/8/2021)      [DISCONTINUED] carBAMazepine (TEGRETOL) 200 mg tablet 800mg in am and 600mg in pm MON and THUR  600mg bid other days  Indications: Prevention of Seizures following Head Injury or Surgery (Patient taking differently: 600mg in am and 600mg in pm 7 days a week  Indications: prevention of seizures following head injury or surgery) 210 Tab 11     No current facility-administered medications on file prior to visit.      Allergies   Allergen Reactions    Adhesive Tape-Silicones Rash     Family History   Problem Relation Age of Onset    Hypertension Mother 80    Stroke Father 68    Heart Disease Father     Hypertension Father     Cancer Father         colorectal spread to liver     Other Maternal Grandmother 80    Other Maternal Grandfather 80    Heart Disease Paternal Aunt         mi    Heart Disease Paternal Uncle         mi     Social History Socioeconomic History    Marital status:    Tobacco Use    Smoking status: Former Smoker     Packs/day: 1.50     Years: 10.00     Pack years: 15.00     Types: Cigarettes     Quit date: 2/4/2011     Years since quitting: 10.7    Smokeless tobacco: Former User    Tobacco comment: chart indicated \"heavy smoker\"   Vaping Use    Vaping Use: Never used   Substance and Sexual Activity    Alcohol use: Yes     Comment: rarely    Drug use: No                Review of Systems   Constitutional: Negative for chills, diaphoresis, fever, malaise/fatigue and weight loss. Eyes: Negative for blurred vision, double vision, pain and redness. Respiratory: Negative for cough, shortness of breath and wheezing. Cardiovascular: Negative for chest pain, palpitations, orthopnea, claudication, leg swelling and PND. Skin: Negative for itching and rash. Neurological: Negative for dizziness, tingling, tremors, sensory change, speech change, focal weakness, seizures, loss of consciousness, weakness and headaches. Results for orders placed or performed in visit on 09/28/20   CARBAMAZEPINE   Result Value Ref Range    Carbamazepine 9.5 4.0 - 12.0 ug/mL   CBC WITH AUTOMATED DIFF   Result Value Ref Range    WBC 6.4 3.4 - 10.8 x10E3/uL    RBC 3.85 (L) 4.14 - 5.80 x10E6/uL    HGB 12.2 (L) 13.0 - 17.7 g/dL    HCT 36.0 (L) 37.5 - 51.0 %    MCV 94 79 - 97 fL    MCH 31.7 26.6 - 33.0 pg    MCHC 33.9 31.5 - 35.7 g/dL    RDW 11.6 11.6 - 15.4 %    PLATELET 652 (H) 426 - 450 x10E3/uL    NEUTROPHILS 52 Not Estab. %    Lymphocytes 30 Not Estab. %    MONOCYTES 14 Not Estab. %    EOSINOPHILS 2 Not Estab. %    BASOPHILS 1 Not Estab. %    ABS. NEUTROPHILS 3.3 1.4 - 7.0 x10E3/uL    Abs Lymphocytes 2.0 0.7 - 3.1 x10E3/uL    ABS. MONOCYTES 0.9 0.1 - 0.9 x10E3/uL    ABS. EOSINOPHILS 0.1 0.0 - 0.4 x10E3/uL    ABS. BASOPHILS 0.1 0.0 - 0.2 x10E3/uL    IMMATURE GRANULOCYTES 1 Not Estab. %    ABS. IMM.  GRANS. 0.0 0.0 - 0.1 K69O4/TA   METABOLIC PANEL, COMPREHENSIVE   Result Value Ref Range    Glucose 85 65 - 99 mg/dL    BUN 7 6 - 24 mg/dL    Creatinine 0.74 (L) 0.76 - 1.27 mg/dL    GFR est non- >59 mL/min/1.73    GFR est  >59 mL/min/1.73    BUN/Creatinine ratio 9 9 - 20    Sodium 141 134 - 144 mmol/L    Potassium 4.7 3.5 - 5.2 mmol/L    Chloride 100 96 - 106 mmol/L    CO2 30 (H) 20 - 29 mmol/L    Calcium 9.7 8.7 - 10.2 mg/dL    Protein, total 7.3 6.0 - 8.5 g/dL    Albumin 4.5 3.8 - 4.9 g/dL    GLOBULIN, TOTAL 2.8 1.5 - 4.5 g/dL    A-G Ratio 1.6 1.2 - 2.2    Bilirubin, total <0.2 0.0 - 1.2 mg/dL    Alk. phosphatase 93 39 - 117 IU/L    AST (SGOT) 22 0 - 40 IU/L    ALT (SGPT) 27 0 - 44 IU/L   MAGNESIUM   Result Value Ref Range    Magnesium 2.3 1.6 - 2.3 mg/dL             Physical Exam  Visit Vitals  /72 (BP 1 Location: Left arm, BP Patient Position: Sitting, BP Cuff Size: Large adult)   Pulse 71   Temp 97.5 °F (36.4 °C)   Resp 17   Ht 5' 6\" (1.676 m)   Wt 208 lb (94.3 kg)   SpO2 97%   BMI 33.57 kg/m²         Head: Normocephalic, without obvious abnormality, atraumatic  Eyes: conjunctivae/corneas clear. PERRL, EOM's intact. Neck: supple, symmetrical, trachea midline, no adenopathy, thyroid: not enlarged, symmetric, no tenderness/mass/nodules, no carotid bruit and no JVD  Lungs: clear to auscultation bilaterally  Heart: regular rate and rhythm, S1, S2 normal, no murmur, click, rub or gallop  Extremities: extremities normal, atraumatic, no cyanosis or edema  Pulses: 2+ and symmetric  Lymph nodes: Cervical, supraclavicular, and axillary nodes normal.  Neurologic: Grossly normal        ASSESSMENT and PLAN    ICD-10-CM ICD-9-CM    1. Seizures- only one in 1982-due to AVM/ assoc Sx  R56.9 780.39 carBAMazepine (TEGretoL) 200 mg tablet     Diagnoses and all orders for this visit:    1.  Seizures- only one in 1982-due to AVM/ assoc Sx  -     carBAMazepine (TEGretoL) 200 mg tablet; 600mg in am and 600mg in pm 7 days a week  Indications: prevention of seizures following head injury or surgery            reviewed medications and side effects in detail  Please call my office if there are any questions- [phone]. Discussed expected course/resolution/complications of diagnosis in detail with patient. Medication risks/benefits/costs/interactions/alternatives discussed with patient. Pt was given an after visit summary which includes diagnoses, current medications & vitals. Pt expressed understanding with the diagnosis and plan. Patient to call if no better in 3 -4 days and prn new problems. Total 30 minutes  re: Recommended a weekly \"heart check. \" I went into detail how to do this. Regular exercise is very important to your health; it helps mentally, physically, socially; it prevents injuries if done properly. Exercise, even as simple as walking 20-30 minutes daily has major benefits to your health even though your \"numbers\" are the same in the lab. See if you can add this into your daily regimen and after a few months it will become a regular habit-\"just something you do,\" like brushing your teeth. A combination of aerobic exercise and strengthening and stretching is felt to be the best for you, so this should be your ultimate goal.   This can be done in the privacy of your home or in a group setting as at the gym  Some prefer having a , others prefer to do exercise in groups or individually. Do what \"works\" for you. You need to make it simple and \"fun,\" or you most likely will not continue it. Reviewed symptoms, or lack thereof, of hypertension and elevated cholesterol. Also, discussed symptoms of concern that were noted today in the note above, treatment options( including doing nothing), when to follow up before recommended time frame. Also, answered all questions. Pt's spasticity and seizure started with AV malformation years ago and he has had control of seizures completely with the medicine he is taking with no breakthrough. He was encouraged to come into the office once a year to follow up in his health problems, mainly anxiety and his hx of seizures which is mainly followed by neurologist.     I encouraged him to make an appointment for physical perhaps month from now as the last one he had was from a year ago. We talked briefly about vascular disease being the number one killer and that he has several secondary relatives that had early CAD. He is not presently on a statin but he might consider that. We will talked about this when he comes in for physical.     I congratulated him on his stopping smoking 8 years ago and encouraged him to continue with that. Also encouraged him to continue working on his weight. His weight was 163 lb back in 2010. He was 20 lb up in the next 2 years, and today, he is 208 lb. This is a few pound drop over the past year but to remain healthy would be best for him to decrease his weight down to 163 lb that he was in 2010. We talked about the importance of doing that and that you cannot do that in a short time and it would take a long time to control    This document was written by Sonal Lam, as dictated by Silke Mederos MD.  I have reviewed and agree with the above note and have made corrections where appropriate Janusz Clark M.D.

## 2021-11-08 NOTE — PROGRESS NOTES
Chief Complaint   Patient presents with    Anxiety     refills   1. \"Have you been to the ER, urgent care clinic since your last visit? Hospitalized since your last visit? \" No    2. \"Have you seen or consulted any other health care providers outside of the [address] [place] [address] since your last visit? \" Derm     3. For patients over 45: Has the patient had a colonoscopy?  Yes, HM satisfied with blue hyperlink

## 2021-11-11 VITALS
OXYGEN SATURATION: 97 % | HEIGHT: 66 IN | BODY MASS INDEX: 33.43 KG/M2 | DIASTOLIC BLOOD PRESSURE: 72 MMHG | RESPIRATION RATE: 17 BRPM | SYSTOLIC BLOOD PRESSURE: 135 MMHG | WEIGHT: 208 LBS | TEMPERATURE: 97.5 F | HEART RATE: 71 BPM

## 2021-11-11 RX ORDER — CARBAMAZEPINE 200 MG/1
TABLET ORAL
Qty: 210 TABLET | Refills: 11
Start: 2021-11-11

## 2021-11-11 RX ORDER — PAROXETINE HYDROCHLORIDE 40 MG/1
TABLET, FILM COATED ORAL
Qty: 90 TABLET | Refills: 0 | Status: SHIPPED | OUTPATIENT
Start: 2021-11-11 | End: 2022-02-06

## 2021-12-26 RX ORDER — VENLAFAXINE HYDROCHLORIDE 75 MG/1
CAPSULE, EXTENDED RELEASE ORAL
Qty: 90 CAPSULE | Refills: 1 | Status: SHIPPED | OUTPATIENT
Start: 2021-12-26 | End: 2022-06-02 | Stop reason: SDUPTHER

## 2022-02-06 RX ORDER — PAROXETINE HYDROCHLORIDE 40 MG/1
TABLET, FILM COATED ORAL
Qty: 90 TABLET | Refills: 1 | Status: SHIPPED | OUTPATIENT
Start: 2022-02-06 | End: 2022-08-08 | Stop reason: SDUPTHER

## 2022-02-13 RX ORDER — BUSPIRONE HYDROCHLORIDE 15 MG/1
TABLET ORAL
Qty: 360 TABLET | Refills: 1 | Status: SHIPPED | OUTPATIENT
Start: 2022-02-13 | End: 2022-09-06 | Stop reason: SDUPTHER

## 2022-03-19 PROBLEM — M21.371 ACQUIRED RIGHT FOOT DROP: Status: ACTIVE | Noted: 2020-10-07

## 2022-03-20 PROBLEM — E78.5 DYSLIPIDEMIA, GOAL LDL BELOW 100: Status: ACTIVE | Noted: 2019-09-20

## 2022-05-11 ENCOUNTER — OFFICE VISIT (OUTPATIENT)
Dept: FAMILY MEDICINE CLINIC | Age: 57
End: 2022-05-11
Payer: COMMERCIAL

## 2022-05-11 VITALS
HEART RATE: 68 BPM | RESPIRATION RATE: 16 BRPM | DIASTOLIC BLOOD PRESSURE: 62 MMHG | BODY MASS INDEX: 33.27 KG/M2 | OXYGEN SATURATION: 98 % | WEIGHT: 207 LBS | HEIGHT: 66 IN | SYSTOLIC BLOOD PRESSURE: 128 MMHG | TEMPERATURE: 98 F

## 2022-05-11 DIAGNOSIS — Z12.5 SCREENING PSA (PROSTATE SPECIFIC ANTIGEN): ICD-10-CM

## 2022-05-11 DIAGNOSIS — E55.9 VITAMIN D DEFICIENCY: ICD-10-CM

## 2022-05-11 DIAGNOSIS — Z86.69: ICD-10-CM

## 2022-05-11 DIAGNOSIS — Z85.828 HISTORY OF SKIN CANCER: ICD-10-CM

## 2022-05-11 DIAGNOSIS — M21.371 ACQUIRED RIGHT FOOT DROP: ICD-10-CM

## 2022-05-11 DIAGNOSIS — L70.0 ACNE VULGARIS: ICD-10-CM

## 2022-05-11 DIAGNOSIS — Z98.1 S/P SPINAL FUSION: ICD-10-CM

## 2022-05-11 DIAGNOSIS — R56.9 SEIZURES (HCC): ICD-10-CM

## 2022-05-11 DIAGNOSIS — Q28.2 AVM (ARTERIOVENOUS MALFORMATION) BRAIN: Chronic | ICD-10-CM

## 2022-05-11 DIAGNOSIS — M19.011 PRIMARY OSTEOARTHRITIS OF RIGHT SHOULDER: ICD-10-CM

## 2022-05-11 DIAGNOSIS — F43.23 ADJUSTMENT DISORDER WITH MIXED ANXIETY AND DEPRESSED MOOD: ICD-10-CM

## 2022-05-11 DIAGNOSIS — Z00.00 PHYSICAL EXAM: Primary | ICD-10-CM

## 2022-05-11 PROCEDURE — 99396 PREV VISIT EST AGE 40-64: CPT | Performed by: FAMILY MEDICINE

## 2022-05-11 NOTE — PROGRESS NOTES
Chief Complaint   Patient presents with    Complete Physical    Shoulder Pain     rt   1. \"Have you been to the ER, urgent care clinic since your last visit? Hospitalized since your last visit? \" Yes Er fell and hit head on door jam    2. \"Have you seen or consulted any other health care providers outside of the [address] [place] [address] since your last visit? \" No     3. For patients aged 39-70: Has the patient had a colonoscopy / FIT/ Cologuard?  Yes - no Care Gap present

## 2022-05-11 NOTE — PROGRESS NOTES
HISTORY OF PRESENT ILLNESS  HPI  Hayde Ho is a 60 y. o. male with a history of AVM (1981), adjustment disorder with anxiety and depression, seizures, convulsions, osteoarthritis of right shoulder and skin cancer, who presents to the office today for a complete physical and for follow up of these health problems. Pt last ate at 11:45 am today, having mashed potatoes and mini corn dogs with chocolate milk. Pt was [facility] at [facility] following a fall. Pt tripped and hit his forehead onto a door jam. There was no internal injury found. He also had x-ray done that was normal.    Pt denies unusual SOB, chest pain, and any other ER visits or hospitalizations since February.          Past Medical History:   Diagnosis Date    Adjustment disorder with mixed anxiety and depressed mood     AVM (arteriovenous malformation)left brain-Surgery 1981-spasticity of right arm and leg,seizure 1982 17/70/5137    Clicking right shoulder 4/6/2015    Dyslipidemia, goal LDL below 100 9/20/2019    History of skin cancer 4/6/2015    Primary osteoarthritis of right shoulder- on 12/2015 X ray 1/9/2016    Seizures ([facility] [address]     last seizure 1982     Past Surgical History:   Procedure Laterality Date    HX HEENT      wisdom teeth    HX TONSIL AND ADENOIDECTOMY      HX TONSILLECTOMY      NEUROLOGICAL PROCEDURE UNLISTED  22 yo   1981    L side brain surgery/ arteriovenous malformation     Current Outpatient Medications on File Prior to Visit   Medication Sig Dispense Refill    busPIRone (BUSPAR) 15 mg tablet TAKE TWO TABLETS BY MOUTH TWICE A  Tablet 1    PARoxetine (PAXIL) 40 mg tablet TAKE ONE TABLET BY MOUTH DAILY 90 Tablet 1    venlafaxine-SR (EFFEXOR-XR) 75 mg capsule TAKE ONE CAPSULE BY MOUTH DAILY 90 Capsule 1    carBAMazepine (TEGretoL) 200 mg tablet 600mg in am and 600mg in pm 7 days a week  Indications: prevention of seizures following head injury or surgery 210 Tablet 11    clobetasoL (TEMOVATE) 0.05 % topical cream Apply  to affected area.  timolol (TIMOPTIC-XE) 0.5 % ophthalmic gel-forming       adapalene (DIFFERIN) 0.3 % topical gel       benzoyl peroxide 5 % external liquid       Sulfacetamide Sodium-Sulfur 10-5 % (w/w) lotion       diclofenac EC (VOLTAREN) 75 mg EC tablet TAKE ONE TABLET BY MOUTH TWICE A DAY  WITH FOOD AS NEEDED FOR SHOULDER PAIN. **GENERIC FOR: VOLTAREN 60 Tab 0    acetaminophen (TYLENOL EXTRA STRENGTH) 500 mg tablet Take 2 Tabs by mouth every six (6) hours as needed for Pain.  AVAR-E LS 10-2 % crea       doxycycline (VIBRAMYCIN) 100 mg capsule Take 100 mg by mouth daily.  multivit-min-FA-K-lycopene (ONE-A-DAY MEN'S 50+ ADVANTAGE) 400- mcg Tab Take  by mouth. No current facility-administered medications on file prior to visit. Allergies   Allergen Reactions    Adhesive Tape-Silicones Rash     Family History   Problem Relation Age of Onset    Hypertension Mother 80    Stroke Father 68    Heart Disease Father     Hypertension Father     Cancer Father         colorectal spread to liver     Other Maternal Grandmother 80    Other Maternal Grandfather 80    Heart Disease Paternal Aunt         mi    Heart Disease Paternal Uncle         mi     Social History     Socioeconomic History    Marital status:    Tobacco Use    Smoking status: Former Smoker     Packs/day: 1.50     Years: 10.00     Pack years: 15.00     Types: Cigarettes     Quit date: 2011     Years since quittin.2    Smokeless tobacco: Former User    Tobacco comment: chart indicated \"heavy smoker\"   Vaping Use    Vaping Use: Never used   Substance and Sexual Activity    Alcohol use: Yes     Comment: rarely    Drug use: No                 Review of Systems   Constitutional: Negative for chills, diaphoresis, fever, malaise/fatigue and weight loss. HENT: Negative for congestion, ear discharge, ear pain, hearing loss, nosebleeds, sore throat and tinnitus.     Eyes: Negative for blurred vision, double vision, photophobia, pain, discharge and redness. Respiratory: Negative for cough, hemoptysis, sputum production, shortness of breath, wheezing and stridor. Cardiovascular: Negative for chest pain, palpitations, orthopnea, claudication, leg swelling and PND. Gastrointestinal: Negative for abdominal pain, blood in stool, constipation, diarrhea, heartburn, melena, nausea and vomiting. Genitourinary: Negative for dysuria, flank pain, frequency, hematuria and urgency. Musculoskeletal: Negative for back pain, falls, joint pain, myalgias and neck pain. Skin: Negative for itching and rash. Neurological: Negative for dizziness, tingling, tremors, sensory change, speech change, focal weakness, seizures, loss of consciousness, weakness and headaches. Endo/Heme/Allergies: Negative for environmental allergies and polydipsia. Does not bruise/bleed easily. Psychiatric/Behavioral: Negative for depression, hallucinations, memory loss, substance abuse and suicidal ideas. The patient is not nervous/anxious and does not have insomnia. Results for orders placed or performed in visit on 09/28/20   CARBAMAZEPINE   Result Value Ref Range    Carbamazepine 9.5 4.0 - 12.0 ug/mL   CBC WITH AUTOMATED DIFF   Result Value Ref Range    WBC 6.4 3.4 - 10.8 x10E3/uL    RBC 3.85 (L) 4.14 - 5.80 x10E6/uL    HGB 12.2 (L) 13.0 - 17.7 g/dL    HCT 36.0 (L) 37.5 - 51.0 %    MCV 94 79 - 97 fL    MCH 31.7 26.6 - 33.0 pg    MCHC 33.9 31.5 - 35.7 g/dL    RDW 11.6 11.6 - 15.4 %    PLATELET 752 (H) 086 - 450 x10E3/uL    NEUTROPHILS 52 Not Estab. %    Lymphocytes 30 Not Estab. %    MONOCYTES 14 Not Estab. %    EOSINOPHILS 2 Not Estab. %    BASOPHILS 1 Not Estab. %    ABS. NEUTROPHILS 3.3 1.4 - 7.0 x10E3/uL    Abs Lymphocytes 2.0 0.7 - 3.1 x10E3/uL    ABS. MONOCYTES 0.9 0.1 - 0.9 x10E3/uL    ABS. EOSINOPHILS 0.1 0.0 - 0.4 x10E3/uL    ABS. BASOPHILS 0.1 0.0 - 0.2 x10E3/uL    IMMATURE GRANULOCYTES 1 Not Estab. %    ABS. IMM. Peggy Sheppard. 0.0 0.0 - 0.1 W85E3/TW   METABOLIC PANEL, COMPREHENSIVE   Result Value Ref Range    Glucose 85 65 - 99 mg/dL    BUN 7 6 - 24 mg/dL    Creatinine 0.74 (L) 0.76 - 1.27 mg/dL    GFR est non- >59 mL/min/1.73    GFR est  >59 mL/min/1.73    BUN/Creatinine ratio 9 9 - 20    Sodium 141 134 - 144 mmol/L    Potassium 4.7 3.5 - 5.2 mmol/L    Chloride 100 96 - 106 mmol/L    CO2 30 (H) 20 - 29 mmol/L    Calcium 9.7 8.7 - 10.2 mg/dL    Protein, total 7.3 6.0 - 8.5 g/dL    Albumin 4.5 3.8 - 4.9 g/dL    GLOBULIN, TOTAL 2.8 1.5 - 4.5 g/dL    A-G Ratio 1.6 1.2 - 2.2    Bilirubin, total <0.2 0.0 - 1.2 mg/dL    Alk. phosphatase 93 39 - 117 IU/L    AST (SGOT) 22 0 - 40 IU/L    ALT (SGPT) 27 0 - 44 IU/L   MAGNESIUM   Result Value Ref Range    Magnesium 2.3 1.6 - 2.3 mg/dL             Physical Exam  Visit Vitals  /62 (BP 1 Location: Left arm, BP Patient Position: Sitting, BP Cuff Size: Large adult)   Pulse 68   Temp 98 °F (36.7 °C)   Resp 16   Ht 5' 6\" (1.676 m)   Wt 207 lb (93.9 kg)   SpO2 98%   BMI 33.41 kg/m²         General:  Alert, cooperative, no distress, appears stated age. Head:  Normocephalic, without obvious abnormality, atraumatic. Eyes:  Conjunctivae/corneas clear. PERRL, EOMs intact. Fundi benign   Ears:  Normal TMs and external ear canals both ears. Nose: Nares normal. Septum midline. Mucosa normal. No drainage or sinus tenderness. Throat: Lips, mucosa, and tongue normal. Teeth and gums normal.   Neck: Supple, symmetrical, trachea midline, no adenopathy, thyroid: no enlargement/tenderness/nodules, no carotid bruit and no JVD. Back:   Symmetric, no curvature. ROM normal. No CVA tenderness. Lungs:   Clear to auscultation bilaterally. Chest wall:  No tenderness or deformity. Heart:  Regular rate and rhythm, S1, S2 normal, no murmur, click, rub or gallop. Abdomen:   Soft, non-tender. Bowel sounds normal. No masses,  No organomegaly.    Extremities: Extremities normal, atraumatic, no cyanosis. Trace edema. Pulses: 2+ and symmetric all extremities. Skin: Skin color, texture, turgor normal. No rashes or lesions   Lymph nodes: Cervical, supraclavicular, and axillary nodes normal.   Neurologic: CNII-XII intact. Normal strength, sensation and reflexes throughout. ASSESSMENT and PLAN    ICD-10-CM ICD-9-CM    1. Physical exam  Z00.00 V70.9 LIPID PANEL      METABOLIC PANEL, COMPREHENSIVE      CBC W/O DIFF      PSA W/ REFLX FREE PSA      URINALYSIS W/MICROSCOPIC      LIPID PANEL      METABOLIC PANEL, COMPREHENSIVE      CBC W/O DIFF      PSA W/ REFLX FREE PSA      URINALYSIS W/MICROSCOPIC   2. Seizures- only one in 1982-due to AVM/ assoc Sx  R56.9 780.39 CARBAMAZEPINE      CARBAMAZEPINE   3. Screening PSA (prostate specific antigen)  Z12.5 V76.44 PSA W/ REFLX FREE PSA      PSA W/ REFLX FREE PSA   4. Primary osteoarthritis of right shoulder- on 12/2015 X ray  M19.011 715.11    5. Vitamin D deficiency  E55.9 268.9    6. S/P spinal fusion  Z98.1 V45.4    7. Adjustment disorder with mixed anxiety and depressed mood  F43.23 309.28    8. Acquired right foot drop  M21.371 736.79    9. History of chronic angle-closure glaucoma  Z86.69 V12.49    10. History of skin cancer  Z85.828 V10.83    11. AVM (arteriovenous malformation)left brain-Surgery 1981-spasticity of right arm and leg,seizure in 1982  Q28.2 747.81    12. Acne vulgaris  L70.0 706.1      Diagnoses and all orders for this visit:    1. Physical exam  -     LIPID PANEL; Future  -     METABOLIC PANEL, COMPREHENSIVE; Future  -     CBC W/O DIFF; Future  -     PSA W/ REFLX FREE PSA; Future  -     URINALYSIS W/MICROSCOPIC; Future    2. Seizures- only one in 1982-due to AVM/ assoc Sx  -     CARBAMAZEPINE; Future    3. Screening PSA (prostate specific antigen)  -     PSA W/ REFLX FREE PSA; Future    4. Primary osteoarthritis of right shoulder- on 12/2015 X ray    5. Vitamin D deficiency    6. S/P spinal fusion    7.  Adjustment disorder with mixed anxiety and depressed mood    8. Acquired right foot drop    9. History of chronic angle-closure glaucoma    10. History of skin cancer    11. AVM (arteriovenous malformation)left brain-Surgery 1981-spasticity of right arm and leg,seizure in 1982    12. Acne vulgaris            lab results and schedule of future lab studies reviewed with patient  reviewed diet, exercise and weight control  cardiovascular risk and specific lipid/LDL goals reviewed  reviewed medications and side effects in detail  Please call my office if there are any questions- [phone]. I will arrange for follow up after the lab tests done from today return  Recommended a weekly \"heart check. \" I went into detail how to do this. Call for refills on medications as needed. Discussed expected course/resolution/complications of diagnosis in detail with patient. Medication risks/benefits/costs/interactions/alternatives discussed with patient. Pt was given an after visit summary which includes diagnoses, current medications & vitals. Pt expressed understanding with the diagnosis and plan      BMI is significantly elevated- in the obese range. I reviewed diet, exercise and weight control. Discussed weight control in detail, the importance of mainly decreased carbs, and for weight maintenance, exercise; discussed different diets and that it isn't as important to watch the type of foods as it is to decrease calorie intake no matter what type of diet you do, etc.       Total 30 minutes  re: Reviewed symptoms, or lack thereof, of hypertension and elevated cholesterol. Regular exercise is very important to your health; it helps mentally, physically, socially; it prevents injuries if done properly. Exercise, even as simple as walking 20-30 minutes daily has major benefits to your health even though your \"numbers\" are the same in the lab.  See if you can add this into your daily regimen and after a few months it will become a regular habit-\"just something you do,\" like brushing your teeth. A combination of aerobic exercise and strengthening and stretching is felt to be the best for you, so this should be your ultimate goal.   This can be done in the privacy of your home or in a group setting as at the gym  Some prefer having a , others prefer to do exercise in groups or individually. Do what \"works\" for you. You need to make it simple and \"fun,\" or you most likely will not continue it. Recommended a weekly \"heart check. \" I went into detail how to do this. Also, discussed symptoms of concern that were noted today in the note above, treatment options( including doing nothing), when to follow up before recommended time frame. Also, answered all questions. I encouraged pt to continue working on his weight and we talked about how to do that. I congratulated him on his recent weight loss, but 11 years ago he was 44 lbs lighter so he has gained about 4 lbs a year over the past 10 years. This document was written by Sundar Leon, as dictated by Dann Rodriguez MD.   I have reviewed and agree with the above note and have made corrections where appropriate Janusz Spaulding M.D.

## 2022-05-13 PROBLEM — Z86.69: Status: ACTIVE | Noted: 2022-05-13

## 2022-05-13 LAB
ALBUMIN SERPL-MCNC: 4.7 G/DL (ref 3.8–4.9)
ALBUMIN/GLOB SERPL: 2.2 {RATIO} (ref 1.2–2.2)
ALP SERPL-CCNC: 82 IU/L (ref 44–121)
ALT SERPL-CCNC: 19 IU/L (ref 0–44)
APPEARANCE UR: CLEAR
AST SERPL-CCNC: 23 IU/L (ref 0–40)
BACTERIA #/AREA URNS HPF: NORMAL /[HPF]
BILIRUB SERPL-MCNC: <0.2 MG/DL (ref 0–1.2)
BILIRUB UR QL STRIP: NEGATIVE
BUN SERPL-MCNC: 18 MG/DL (ref 6–24)
BUN/CREAT SERPL: 19 (ref 9–20)
CALCIUM SERPL-MCNC: 9.7 MG/DL (ref 8.7–10.2)
CARBAMAZEPINE SERPL-MCNC: 6.3 UG/ML (ref 4–12)
CASTS URNS QL MICRO: NORMAL /LPF
CHLORIDE SERPL-SCNC: 103 MMOL/L (ref 96–106)
CHOLEST SERPL-MCNC: 203 MG/DL (ref 100–199)
CO2 SERPL-SCNC: 24 MMOL/L (ref 20–29)
COLOR UR: YELLOW
CREAT SERPL-MCNC: 0.96 MG/DL (ref 0.76–1.27)
EGFR: 92 ML/MIN/1.73
EPI CELLS #/AREA URNS HPF: NORMAL /HPF (ref 0–10)
ERYTHROCYTE [DISTWIDTH] IN BLOOD BY AUTOMATED COUNT: 11.7 % (ref 11.6–15.4)
GLOBULIN SER CALC-MCNC: 2.1 G/DL (ref 1.5–4.5)
GLUCOSE SERPL-MCNC: 94 MG/DL (ref 65–99)
GLUCOSE UR QL STRIP: NEGATIVE
HCT VFR BLD AUTO: 40.7 % (ref 37.5–51)
HDLC SERPL-MCNC: 67 MG/DL
HGB BLD-MCNC: 13.4 G/DL (ref 13–17.7)
HGB UR QL STRIP: NEGATIVE
IMP & REVIEW OF LAB RESULTS: NORMAL
KETONES UR QL STRIP: ABNORMAL
LDLC SERPL CALC-MCNC: 107 MG/DL (ref 0–99)
LEUKOCYTE ESTERASE UR QL STRIP: ABNORMAL
MCH RBC QN AUTO: 32.1 PG (ref 26.6–33)
MCHC RBC AUTO-ENTMCNC: 32.9 G/DL (ref 31.5–35.7)
MCV RBC AUTO: 98 FL (ref 79–97)
MICRO URNS: ABNORMAL
NITRITE UR QL STRIP: NEGATIVE
PH UR STRIP: 7.5 [PH] (ref 5–7.5)
PLATELET # BLD AUTO: 272 X10E3/UL (ref 150–450)
POTASSIUM SERPL-SCNC: 4.9 MMOL/L (ref 3.5–5.2)
PROT SERPL-MCNC: 6.8 G/DL (ref 6–8.5)
PROT UR QL STRIP: ABNORMAL
PSA SERPL-MCNC: 0.5 NG/ML (ref 0–4)
RBC # BLD AUTO: 4.17 X10E6/UL (ref 4.14–5.8)
RBC #/AREA URNS HPF: NORMAL /HPF (ref 0–2)
REFLEX CRITERIA: NORMAL
SODIUM SERPL-SCNC: 143 MMOL/L (ref 134–144)
SP GR UR STRIP: >=1.03 (ref 1–1.03)
TRIGL SERPL-MCNC: 169 MG/DL (ref 0–149)
UROBILINOGEN UR STRIP-MCNC: 1 MG/DL (ref 0.2–1)
VLDLC SERPL CALC-MCNC: 29 MG/DL (ref 5–40)
WBC # BLD AUTO: 6.9 X10E3/UL (ref 3.4–10.8)
WBC #/AREA URNS HPF: NORMAL /HPF (ref 0–5)

## 2022-06-02 NOTE — TELEPHONE ENCOUNTER
Last Visit: 5/11/22 MD Marzena Valentino  Next Appointment: None  Previous Refill Encounter(s): 12/26/21 90 + 1    Requested Prescriptions     Pending Prescriptions Disp Refills    venlafaxine-SR (EFFEXOR-XR) 75 mg capsule 90 Capsule 1     Sig: Take 1 Capsule by mouth daily.        For Pharmacy Admin Tracking Only     Intervention Detail: New Rx: 1, reason: Patient Preference   Time Spent (min): 1

## 2022-06-03 RX ORDER — VENLAFAXINE HYDROCHLORIDE 75 MG/1
75 CAPSULE, EXTENDED RELEASE ORAL DAILY
Qty: 90 CAPSULE | Refills: 1 | Status: SHIPPED | OUTPATIENT
Start: 2022-06-03

## 2022-08-08 DIAGNOSIS — F43.23 ADJUSTMENT DISORDER WITH MIXED ANXIETY AND DEPRESSED MOOD: Primary | ICD-10-CM

## 2022-08-08 RX ORDER — PAROXETINE HYDROCHLORIDE 40 MG/1
40 TABLET, FILM COATED ORAL DAILY
Qty: 90 TABLET | Refills: 0 | Status: SHIPPED | OUTPATIENT
Start: 2022-08-08 | End: 2022-11-07 | Stop reason: SDUPTHER

## 2022-09-06 RX ORDER — BUSPIRONE HYDROCHLORIDE 15 MG/1
30 TABLET ORAL 2 TIMES DAILY
Qty: 360 TABLET | Refills: 1 | Status: SHIPPED | OUTPATIENT
Start: 2022-09-06

## 2022-09-06 NOTE — TELEPHONE ENCOUNTER
Last Visit: 5/11/22 MD Mena Jerry  Next Appointment: None  Previous Refill Encounter(s): 2/13/22 360 + 1    Requested Prescriptions     Pending Prescriptions Disp Refills    busPIRone (BUSPAR) 15 mg tablet 360 Tablet 1     Sig: Take 2 Tablets by mouth two (2) times a day. For [facility] in place:   Recommendation Provided To:    Intervention Detail: New Rx: 1, reason: Patient Preference  Gap Closed?:   Intervention Accepted By:   Time Spent (min): 5

## 2022-11-07 DIAGNOSIS — F43.23 ADJUSTMENT DISORDER WITH MIXED ANXIETY AND DEPRESSED MOOD: ICD-10-CM

## 2022-11-07 RX ORDER — PAROXETINE HYDROCHLORIDE 40 MG/1
40 TABLET, FILM COATED ORAL DAILY
Qty: 90 TABLET | Refills: 1 | Status: SHIPPED | OUTPATIENT
Start: 2022-11-07

## 2022-11-07 NOTE — TELEPHONE ENCOUNTER
Last Visit: 5/11/22 MD Verna Baig  Next Appointment: None- due 5/2023  Previous Refill Encounter(s): 8/8/22 90    Requested Prescriptions     Pending Prescriptions Disp Refills    PARoxetine (PAXIL) 40 mg tablet 90 Tablet 1     Sig: Take 1 Tablet by mouth daily. For [address] [facility] in place:   Recommendation Provided To:    Intervention Detail: New Rx: 1, reason: Patient PreferenceGap Closed?:   Intervention Accepted By:   Time Spent (min): 5

## 2022-12-23 RX ORDER — VENLAFAXINE HYDROCHLORIDE 75 MG/1
CAPSULE, EXTENDED RELEASE ORAL
Qty: 90 CAPSULE | Refills: 1 | Status: SHIPPED | OUTPATIENT
Start: 2022-12-23

## 2023-03-05 RX ORDER — BUSPIRONE HYDROCHLORIDE 15 MG/1
TABLET ORAL
Qty: 360 TABLET | Refills: 1 | Status: SHIPPED | OUTPATIENT
Start: 2023-03-05

## 2023-05-09 DIAGNOSIS — F43.23 ADJUSTMENT DISORDER WITH MIXED ANXIETY AND DEPRESSED MOOD: ICD-10-CM

## 2023-05-09 RX ORDER — PAROXETINE HYDROCHLORIDE 40 MG/1
TABLET, FILM COATED ORAL
Qty: 90 TABLET | Refills: 1 | Status: SHIPPED | OUTPATIENT
Start: 2023-05-09

## 2023-07-21 RX ORDER — VENLAFAXINE HYDROCHLORIDE 75 MG/1
CAPSULE, EXTENDED RELEASE ORAL
Qty: 90 CAPSULE | Refills: 1 | OUTPATIENT
Start: 2023-07-21

## 2023-07-21 NOTE — TELEPHONE ENCOUNTER
Spoke to pt on 7.21.23 to inform him that we received a request for his Effexor XR 75 mg. And that before  approve's it the pt need's to make an In Office Appt. \"Pt stated that he has moved to [facility] and has a new PCP. \"

## 2023-08-14 RX ORDER — VENLAFAXINE HYDROCHLORIDE 75 MG/1
CAPSULE, EXTENDED RELEASE ORAL
Qty: 90 CAPSULE | Refills: 1 | OUTPATIENT
Start: 2023-08-14

## 2023-08-14 RX ORDER — BUSPIRONE HYDROCHLORIDE 30 MG/1
TABLET ORAL
Qty: 360 TABLET | OUTPATIENT
Start: 2023-08-14

## 2023-09-25 RX ORDER — BUSPIRONE HYDROCHLORIDE 30 MG/1
TABLET ORAL
Qty: 360 TABLET | OUTPATIENT
Start: 2023-09-25

## 2023-10-10 RX ORDER — BUSPIRONE HYDROCHLORIDE 30 MG/1
TABLET ORAL
Qty: 360 TABLET | OUTPATIENT
Start: 2023-10-10

## 2023-12-01 DIAGNOSIS — F43.23 ADJUSTMENT DISORDER WITH MIXED ANXIETY AND DEPRESSED MOOD: ICD-10-CM

## 2023-12-01 RX ORDER — PAROXETINE HYDROCHLORIDE 40 MG/1
TABLET, FILM COATED ORAL
Qty: 90 TABLET | Refills: 1 | OUTPATIENT
Start: 2023-12-01